# Patient Record
Sex: MALE | Race: WHITE | Employment: OTHER | URBAN - METROPOLITAN AREA
[De-identification: names, ages, dates, MRNs, and addresses within clinical notes are randomized per-mention and may not be internally consistent; named-entity substitution may affect disease eponyms.]

---

## 2020-08-10 ENCOUNTER — OFFICE VISIT (OUTPATIENT)
Dept: INTERNAL MEDICINE CLINIC | Age: 50
End: 2020-08-10
Payer: MEDICAID

## 2020-08-10 VITALS
HEART RATE: 64 BPM | SYSTOLIC BLOOD PRESSURE: 142 MMHG | BODY MASS INDEX: 38.39 KG/M2 | OXYGEN SATURATION: 98 % | TEMPERATURE: 97.7 F | WEIGHT: 259.2 LBS | DIASTOLIC BLOOD PRESSURE: 86 MMHG | RESPIRATION RATE: 16 BRPM | HEIGHT: 69 IN
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
## 2020-08-10 DIAGNOSIS — F51.01 PRIMARY INSOMNIA: ICD-10-CM

## 2020-08-10 DIAGNOSIS — E66.01 SEVERE OBESITY (HCC): ICD-10-CM

## 2020-08-10 DIAGNOSIS — I10 ESSENTIAL HYPERTENSION: Primary | ICD-10-CM

## 2020-08-10 PROCEDURE — 99214 OFFICE O/P EST MOD 30 MIN: CPT | Performed by: INTERNAL MEDICINE

## 2020-08-10 RX ORDER — TRAZODONE HYDROCHLORIDE 100 MG/1
TABLET ORAL
Qty: 45 TAB | Refills: 2 | Status: SHIPPED | OUTPATIENT
Start: 2020-08-10 | End: 2020-11-04

## 2020-08-10 RX ORDER — DILTIAZEM HYDROCHLORIDE 120 MG/1
120 CAPSULE, COATED, EXTENDED RELEASE ORAL DAILY
Qty: 30 CAP | Refills: 3 | Status: CANCELLED | OUTPATIENT
Start: 2020-08-10

## 2020-08-10 NOTE — PROGRESS NOTES
Yousuf Beckett presents today for   Chief Complaint   Patient presents with    Follow Up Chronic Condition     legs and [ain swelling from past tick bite       Is someone accompanying this pt? no    Is the patient using any DME equipment during [address]? no  Depression Screening:  3 most recent PHQ Screens 8/10/2020   Little interest or pleasure in doing things Not at all   Feeling down, depressed, irritable, or hopeless Not at all   Total Score PHQ 2 0       Learning Assessment:  No flowsheet data found. Abuse Screening:  No flowsheet data found. Fall Risk  No flowsheet data found. Health Maintenance reviewed and discussed and ordered per Provider. Health Maintenance Due   Topic Date Due    DTaP/Tdap/Td series (1 - Tdap) 03/11/1991    Lipid Screen  03/11/2010    Shingrix Vaccine Age 50> (1 of 2) 03/11/2020    FOBT Q1Y Age 54-65  03/11/2020    Influenza Age 5 to Adult  08/01/2020   . Coordination of Care:  1. Have you been to the ER, urgent care clinic since your last visit? Hospitalized since your last visit? Rash form doxycycline for tick bite, 6/17/20   2. Have you seen or consulted any other health care providers outside of the [facility] since your last visit? Include any pap smears or colon screening.  no

## 2020-08-10 NOTE — PROGRESS NOTES
1. Severe obesity ([address]  10 years to lose weight and very proud of him. He does have multiple aches and pains associated with his weight loss as well as some fatigue but this is not unexpected. 2. Essential hypertension  Pressure was a little bit high here and he would like to hold off until he sees his cardiologist on the 25th. He reports a history of intermittent A. fib but he is not on anticoagulation. I will defer to his cardiologist    3. Tick bite, initial encounter  She had another tick bite under his left underarm area. I see no signs of Lyme disease he has no lymphadenopathy I recommend conservative treatment at this time    4. Primary insomnia  He clearly has a primary insomnia disorder. We will start trazodone 100 mg nightly. If that is not successful he is to increase to 150 mg nightly. - traZODone (DESYREL) 100 mg tablet; 1 to 1.5 tabs po qhs prn  Dispense: 45 Tab; Refill: 2  Total time spent on this encounter was greater than 25 minutes more than 50% spent in counseling and coordination of care. This includes extensive discussions regarding the things to look for as he continues to lose weight, encouragement on his weight loss and things to look out for in the event that he were to contract Lyme disease or any other virus that can be or bacteria that could be co-transmitted. Chief Complaint   Patient presents with    Follow Up Chronic Condition     legs and [ain swelling from past tick bite        HPI   Is a very pleasant 59-year-old gentleman who presents today with numerous aches and pains. He has been working really hard to lose weight and now that he is lost lost over 100 pounds. ,  He is beginning to have aches and pains repeat everyplace is where he hits. He is also doing some rather strenuous activity reports more muscle pain than usual.  He also got a tick bite in under his left under arm,. He has no sequela.   Denies any fevers chills diffuse rashes body aches or other associated signs or symptoms. A lot of work out in the yard. He notes that he has areas that appear like swelling outpouchings on his skin. However he quickly realized during her interview that it was hanging skin. He did follow-up with urology per my last encounter with him due to a testicular mass which I thought was an epididymal cyst, and the urologist agreed with this assessment. He has no other complaints. Past Medical History:   Diagnosis Date    Afib [facility])         Current Outpatient Medications on File Prior to Visit   Medication Sig Dispense Refill    dilTIAZem ER (CARDIZEM CD) 120 mg capsule       aspirin delayed-release 81 mg tablet Take  by mouth daily. No current facility-administered medications on file prior to visit. ROS  - GEN: + weight loss, no fevers or chills  - HEENT: no vision changes, no tinnitus, no sore throat  - CV: no cp, palpitations or edema  - RESP: no sob, cough  - ABD: no n/v/d, no blood in stool  - : no dysuria or changes in freq. - SKIN: no rashes, ulcers  - Neuro: no resting tremors, parasthesia in extremities, no headaches  - MS: No weakness in extremities, no gait abnormalities  - Psych: negative for depression or anxiety + insomnia      Visit Vitals  /86   Pulse 64   Temp 97.7 °F (36.5 °C) (Temporal)   Resp 16   Ht 5' 9\" (1.753 m)   Wt 259 lb 3.2 oz (117.6 kg)   SpO2 98%   BMI 38.28 kg/m²           Physical Exam  Constitutional:       Appearance: Normal appearance. obese. [place] [employer] NAD and pleasant  HENT:      Head: Normocephalic. Raymond Friday is got a hemorrhoid g s are moist. Throat not inflammed  Eyes:      Extraocular Movements: Extraocular movements intact. Conjunctiva/sclera: Conjunctivae normal. Sclera anicteric     I will stop her I will stop her  Cardiovascular:      Rate and Rhythm: Normal rate and regular rhythm. Pulses: Normal pulses. Pulmonary:      Effort: No respiratory distress. Breath sounds: CTAB and No stridor.  No rhonchi. Abdominal:      General: There is no distension. NT, N vein. no masses  Neurological:      Mental Status: patient is alert and oriented times 3.  No resting tremor, normal gait     Cranial Nerves: cranial nerves grossly intact  Muskuloskeletal     Full ROM in extremities     Normal gait  NO LAD under left arm  Skin     Dry without lesions on examined areas, warm to the touch       Deferred  Psychiatry     Calm, normal affect, interacting normally

## 2020-08-25 ENCOUNTER — TELEPHONE (OUTPATIENT)
Dept: INTERNAL MEDICINE CLINIC | Age: 50
End: 2020-08-25

## 2020-08-25 NOTE — TELEPHONE ENCOUNTER
Patient left a voicemail requesting a PT referral.  HE was seen on 8/10/20. Please call him about this.   [id number]

## 2020-09-02 ENCOUNTER — TELEPHONE (OUTPATIENT)
Dept: INTERNAL MEDICINE CLINIC | Age: 50
End: 2020-09-02

## 2020-09-02 DIAGNOSIS — M54.2 NECK PAIN: Primary | ICD-10-CM

## 2020-09-10 ENCOUNTER — TELEPHONE (OUTPATIENT)
Dept: INTERNAL MEDICINE CLINIC | Age: 50
End: 2020-09-10

## 2020-09-10 NOTE — TELEPHONE ENCOUNTER
Patient called and reports after having course ofk Doxycycline from you that he  still having burning sensation and residual effects for, the tickl bite and wondering id there are some labs or another course of treatment. He would like to talk to you about this.      [phone]

## 2020-09-18 ENCOUNTER — TELEPHONE (OUTPATIENT)
Dept: INTERNAL MEDICINE CLINIC | Age: 50
End: 2020-09-18
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
## 2020-09-18 DIAGNOSIS — S30.861S TICK BITE OF ABDOMEN, SEQUELA: ICD-10-CM

## 2020-09-18 DIAGNOSIS — R53.1 WEAKNESS: Primary | ICD-10-CM

## 2020-09-18 NOTE — TELEPHONE ENCOUNTER
Patient called and said that his tick bite is no better and that he was only ordered for one session of PT and he needs more for his hurt neck. He can be reached at [phone]. [phone]

## 2020-09-18 NOTE — TELEPHONE ENCOUNTER
Patient made aware. The issue he is having with the tick bite is, soreness where the tick bite was. He did take 4 days of Doxycycline but he had a reaction and he was never given any further treatment. This was with previous provider. He also questioned being referred to infectious disease doctor to see if he has any further tick borne illnesses due to side effects he is still having. He sometime has heart palpitations, fatigue, etc and he does not know why they are happening. He said that you did tell him all his bloodwork was normal, but he says it has been 3 months and he is not any better. He wonders if he needs to complete a full round of antibiotics since he did not get to finish the Doxy. Please advise.   [address]

## 2020-09-18 NOTE — TELEPHONE ENCOUNTER
Patient exposed to tick in the past and was treated with 4 days of doxycycline. He discontinued this medication because he did not tolerate it. I having had encounter with him before and he insists he is continuing to have muscle weakness although no other focal symptoms. I am going to go ahead and resend out a Lyme panel as well as a CMP and a CBC.   I am also going to check a CK and aldolase level also ordered additional physical therapy

## 2020-09-21 NOTE — TELEPHONE ENCOUNTER
Patient made aware and will go to [facility] [place] and have his labs done.   [id number]

## 2020-09-22 ENCOUNTER — HOSPITAL ENCOUNTER (OUTPATIENT)
Dept: LAB | Age: 50
Discharge: HOME | End: 2020-09-22

## 2020-09-24 LAB
ALBUMIN SERPL-MCNC: 4.5 G/DL (ref 4–5)
ALBUMIN/GLOB SERPL: 1.4 {RATIO} (ref 1.2–2.2)
ALDOLASE SERPL-CCNC: 5.5 U/L (ref 3.3–10.3)
ALP SERPL-CCNC: 110 IU/L (ref 39–117)
ALT SERPL-CCNC: 20 IU/L (ref 0–44)
AST SERPL-CCNC: 22 IU/L (ref 0–40)
B BURGDOR IGG+IGM SER-ACNC: <0.91 ISR (ref 0–0.9)
BASOPHILS # BLD AUTO: 0.1 X10E3/UL (ref 0–0.2)
BASOPHILS NFR BLD AUTO: 1 %
BILIRUB SERPL-MCNC: 0.4 MG/DL (ref 0–1.2)
BUN SERPL-MCNC: 13 MG/DL (ref 6–24)
BUN/CREAT SERPL: 11 (ref 9–20)
CALCIUM SERPL-MCNC: 9.3 MG/DL (ref 8.7–10.2)
CHLORIDE SERPL-SCNC: 102 MMOL/L (ref 96–106)
CK SERPL-CCNC: 72 U/L (ref 49–439)
CO2 SERPL-SCNC: 19 MMOL/L (ref 20–29)
CREAT SERPL-MCNC: 1.23 MG/DL (ref 0.76–1.27)
EOSINOPHIL # BLD AUTO: 0.3 X10E3/UL (ref 0–0.4)
EOSINOPHIL NFR BLD AUTO: 3 %
ERYTHROCYTE [DISTWIDTH] IN BLOOD BY AUTOMATED COUNT: 13 % (ref 11.6–15.4)
GLOBULIN SER CALC-MCNC: 3.3 G/DL (ref 1.5–4.5)
GLUCOSE SERPL-MCNC: 102 MG/DL (ref 65–99)
HCT VFR BLD AUTO: 48.6 % (ref 37.5–51)
HGB BLD-MCNC: 16.9 G/DL (ref 13–17.7)
IMM GRANULOCYTES # BLD AUTO: 0 X10E3/UL (ref 0–0.1)
IMM GRANULOCYTES NFR BLD AUTO: 0 %
LYMPHOCYTES # BLD AUTO: 3.1 X10E3/UL (ref 0.7–3.1)
LYMPHOCYTES NFR BLD AUTO: 31 %
MCH RBC QN AUTO: 31 PG (ref 26.6–33)
MCHC RBC AUTO-ENTMCNC: 34.8 G/DL (ref 31.5–35.7)
MCV RBC AUTO: 89 FL (ref 79–97)
MONOCYTES # BLD AUTO: 0.7 X10E3/UL (ref 0.1–0.9)
MONOCYTES NFR BLD AUTO: 7 %
NEUTROPHILS # BLD AUTO: 5.6 X10E3/UL (ref 1.4–7)
NEUTROPHILS NFR BLD AUTO: 58 %
PLATELET # BLD AUTO: 319 X10E3/UL (ref 150–450)
POTASSIUM SERPL-SCNC: 4.5 MMOL/L (ref 3.5–5.2)
PROT SERPL-MCNC: 7.8 G/DL (ref 6–8.5)
RBC # BLD AUTO: 5.46 X10E6/UL (ref 4.14–5.8)
SODIUM SERPL-SCNC: 140 MMOL/L (ref 134–144)
SPECIMEN STATUS REPORT, ROLRST: NORMAL
WBC # BLD AUTO: 9.8 X10E3/UL (ref 3.4–10.8)

## 2020-10-16 ENCOUNTER — TELEPHONE (OUTPATIENT)
Dept: INTERNAL MEDICINE CLINIC | Age: 50
End: 2020-10-16

## 2020-10-29 ENCOUNTER — HOSPITAL ENCOUNTER (EMERGENCY)
Age: 50
Discharge: HOME | End: 2020-10-29
Attending: FAMILY MEDICINE
Payer: MEDICAID

## 2020-10-29 ENCOUNTER — APPOINTMENT (OUTPATIENT)
Dept: GENERAL RADIOLOGY | Age: 50
End: 2020-10-29
Attending: FAMILY MEDICINE
Payer: MEDICAID

## 2020-10-29 VITALS
HEART RATE: 81 BPM | OXYGEN SATURATION: 100 % | WEIGHT: 259 LBS | DIASTOLIC BLOOD PRESSURE: 91 MMHG | SYSTOLIC BLOOD PRESSURE: 129 MMHG | BODY MASS INDEX: 38.36 KG/M2 | RESPIRATION RATE: 12 BRPM | TEMPERATURE: 97.9 F | HEIGHT: 69 IN

## 2020-10-29 DIAGNOSIS — I48.91 ATRIAL FIBRILLATION WITH RVR (HCC): Primary | ICD-10-CM

## 2020-10-29 LAB
ANION GAP SERPL CALC-SCNC: 11 MMOL/L
ATRIAL RATE: 146 BPM
BASOPHILS # BLD: 0.1 K/UL (ref 0–0.1)
BASOPHILS NFR BLD: 1 % (ref 0–2)
BUN SERPL-MCNC: 13 MG/DL (ref 9–21)
BUN/CREAT SERPL: 14
CA-I BLD-MCNC: 9 MG/DL (ref 8.5–10.5)
CALCULATED R AXIS, ECG10: 56 DEGREES
CALCULATED T AXIS, ECG11: 8 DEGREES
CHLORIDE SERPL-SCNC: 108 MMOL/L (ref 94–111)
CO2 SERPL-SCNC: 20 MMOL/L (ref 21–33)
CREAT SERPL-MCNC: 0.9 MG/DL (ref 0.8–1.5)
DIAGNOSIS, 93000: NORMAL
EOSINOPHIL # BLD: 0.2 K/UL (ref 0–0.4)
EOSINOPHIL NFR BLD: 2 % (ref 0–5)
ERYTHROCYTE [DISTWIDTH] IN BLOOD BY AUTOMATED COUNT: 13.5 % (ref 11.6–14.5)
GLUCOSE SERPL-MCNC: 94 MG/DL (ref 70–110)
HCT VFR BLD AUTO: 48.6 % (ref 36–48)
HGB BLD-MCNC: 16.4 G/DL (ref 13–16)
IMM GRANULOCYTES # BLD AUTO: 0 K/UL
IMM GRANULOCYTES NFR BLD AUTO: 0 %
LYMPHOCYTES # BLD: 2.1 K/UL (ref 0.9–3.6)
LYMPHOCYTES NFR BLD: 25 % (ref 21–52)
MAGNESIUM SERPL-MCNC: 2 MG/DL (ref 1.7–2.8)
MCH RBC QN AUTO: 30.4 PG (ref 24–34)
MCHC RBC AUTO-ENTMCNC: 33.7 G/DL (ref 31–37)
MCV RBC AUTO: 90 FL (ref 74–97)
MONOCYTES # BLD: 0.8 K/UL (ref 0.05–1.2)
MONOCYTES NFR BLD: 9 % (ref 3–10)
NEUTS SEG # BLD: 5.4 K/UL (ref 1.8–8)
NEUTS SEG NFR BLD: 63 % (ref 40–73)
P-R INTERVAL, ECG05: 33 MS
PLATELET # BLD AUTO: 289 K/UL (ref 135–420)
PMV BLD AUTO: 11 FL (ref 9.2–11.8)
POTASSIUM SERPL-SCNC: 3.9 MMOL/L (ref 3.2–5.1)
Q-T INTERVAL, ECG07: 317 MS
QRS DURATION, ECG06: 106 MS
QTC CALCULATION (BEZET), ECG08: 447 MS
RBC # BLD AUTO: 5.4 M/UL (ref 4.7–5.5)
SODIUM SERPL-SCNC: 139 MMOL/L (ref 135–145)
TROPONIN I SERPL-MCNC: <0.02 NG/ML (ref 0.02–0.05)
VENTRICULAR RATE, ECG03: 119 BPM
WBC # BLD AUTO: 8.5 K/UL (ref 4.6–13.2)

## 2020-10-29 PROCEDURE — 80048 BASIC METABOLIC PNL TOTAL CA: CPT

## 2020-10-29 PROCEDURE — 36415 COLL VENOUS BLD VENIPUNCTURE: CPT

## 2020-10-29 PROCEDURE — 83735 ASSAY OF MAGNESIUM: CPT

## 2020-10-29 PROCEDURE — 93005 ELECTROCARDIOGRAM TRACING: CPT

## 2020-10-29 PROCEDURE — 71045 X-RAY EXAM CHEST 1 VIEW: CPT

## 2020-10-29 PROCEDURE — 85025 COMPLETE CBC W/AUTO DIFF WBC: CPT

## 2020-10-29 PROCEDURE — 84484 ASSAY OF TROPONIN QUANT: CPT

## 2020-10-29 PROCEDURE — 99285 EMERGENCY DEPT VISIT HI MDM: CPT

## 2020-10-29 NOTE — ED PROVIDER NOTES
EMERGENCY DEPARTMENT HISTORY AND PHYSICAL EXAM      Date: 10/29/2020  Patient Name: Benson Farris    History of Presenting Illness     No chief complaint on file. History Provided By: Patient    HPI: Sanchez Read, 48 y.o. male with past medical history significant of A-fib presents to the ED with complaints of palpitations. Pt reports having general malaise all of yesterday. After using the restroom and drinking water last night, patient felt his heart rhythm go into A-fib. Pt awoke with nausea this morning and reports associated mild near-syncope, but denies dizziness, shortness of breath, vomiting, leg swelling, chest pain or any other sx. Pt took diltiazem 120 mg and ASA 81 mg this morning as prescribed. Pt notes that he was dx with A-fib shortly after tick bite 6 months ago and states he has experienced similar issues since. Cardiology: [facility]. There are no other complaints, changes, or physical findings at this time. PCP: Josiah Cary MD    Current Facility-Administered Medications   Medication Dose Route Frequency Provider Last Rate Last Dose    dilTIAZem (CARDIZEM) 125 mg in dextrose 5% 125 mL infusion  10 mg/hr IntraVENous TITRATE Ana Nava MD         Current Outpatient Medications   Medication Sig Dispense Refill    traZODone (DESYREL) 100 mg tablet 1 to 1.5 tabs po qhs prn 45 Tab 2    aspirin delayed-release 81 mg tablet Take  by mouth daily.          Past History     Past Medical History:  Past Medical History:   Diagnosis Date    Afib [facility])        Past Surgical History:  Past Surgical History:   Procedure Laterality Date    HX CHOLECYSTECTOMY      HX CYST REMOVAL      HX HERNIA REPAIR      HX PACEMAKER      Reveal Cha Haley [id number]       Family History:  Family History   Problem Relation Age of Onset    Hypertension Father     Prostate Cancer Father        Social History:  Social History     Tobacco Use    Smoking status: Never Smoker    Smokeless tobacco: Never Used   Substance Use Topics    Alcohol use: Not Currently    Drug use: Never       Allergies: Allergies   Allergen Reactions    Doxycycline Other (comments)     Tingling all over, vision changes         Review of Systems   Review of Systems   Constitutional: Negative for chills, diaphoresis, fatigue and fever. HENT: Negative for congestion, rhinorrhea and sore throat. Eyes: Negative for pain, redness and visual disturbance. Respiratory: Negative for cough, chest tightness, shortness of breath and wheezing. Cardiovascular: Positive for palpitations. Negative for chest pain and leg swelling. Gastrointestinal: Positive for nausea. Negative for abdominal pain, diarrhea and vomiting. Genitourinary: Negative for dysuria. Musculoskeletal: Negative for arthralgias, back pain and myalgias. Skin: Negative for color change and rash. Neurological: Positive for light-headedness. Negative for dizziness, weakness, numbness and headaches. Physical Exam   Physical Exam  Vitals signs and nursing note reviewed. Constitutional:       General: He is awake. He is not in acute distress. Appearance: Normal appearance. He is well-developed. He is obese. He is not ill-appearing, toxic-appearing or diaphoretic. Interventions: Face mask in place. HENT:      Head: Normocephalic and atraumatic. Eyes:      Extraocular Movements: Extraocular movements intact. Pupils: Pupils are equal, round, and reactive to light. Neck:      Musculoskeletal: Normal range of motion and neck supple. Cardiovascular:      Rate and Rhythm: Tachycardia present. Rhythm irregularly irregular. Pulses: Normal pulses. Heart sounds: Normal heart sounds. Pulmonary:      Effort: Pulmonary effort is normal.      Breath sounds: Normal breath sounds. Abdominal:      General: Abdomen is flat. Palpations: Abdomen is soft. Tenderness: There is no abdominal tenderness. Musculoskeletal:      Right lower leg: No edema. Left lower leg: No edema. Skin:     General: Skin is warm and dry. Neurological:      Mental Status: He is alert and oriented to person, place, and time. GCS: GCS eye subscore is 4. GCS verbal subscore is 5. GCS motor subscore is 6. Psychiatric:         Mood and Affect: Mood and affect normal.         Behavior: Behavior normal. Behavior is cooperative. Thought Content: Thought content normal.         Diagnostic Study Results     Labs -     Recent Results (from the past 12 hour(s))   EKG, 12 LEAD, INITIAL    Collection Time: 10/29/20 11:41 AM   Result Value Ref Range    Ventricular Rate 119 BPM    Atrial Rate 146 BPM    P-R Interval 33 ms    QRS Duration 106 ms    Q-T Interval 317 ms    QTC Calculation (Bezet) 447 ms    Calculated R Axis 56 degrees    Calculated T Axis 8 degrees    Diagnosis       Atrial fibrillation  NO ISCHEMIC CHANGES  Baseline wander in lead(s) V1,V3,V4,V5,V6    Confirmed by ROSI Lopez ([id number]) on 10/29/2020 12:59:32 PM     CBC WITH AUTOMATED DIFF    Collection Time: 10/29/20 12:15 PM   Result Value Ref Range    WBC 8.5 4.6 - 13.2 K/uL    RBC 5.40 4.70 - 5.50 M/uL    HGB 16.4 (H) 13.0 - 16.0 g/dL    HCT 48.6 (H) 36.0 - 48.0 %    MCV 90.0 74.0 - 97.0 FL    MCH 30.4 24.0 - 34.0 PG    MCHC 33.7 31.0 - 37.0 g/dL    RDW 13.5 11.6 - 14.5 %    PLATELET 187 095 - 589 K/uL    MPV 11.0 9.2 - 11.8 FL    NEUTROPHILS 63 40 - 73 %    LYMPHOCYTES 25 21 - 52 %    MONOCYTES 9 3 - 10 %    EOSINOPHILS 2 0 - 5 %    BASOPHILS 1 0 - 2 %    IMMATURE GRANULOCYTES 0 %    ABS. NEUTROPHILS 5.4 1.8 - 8.0 K/UL    ABS. LYMPHOCYTES 2.1 0.9 - 3.6 K/UL    ABS. MONOCYTES 0.8 0.05 - 1.2 K/UL    ABS. EOSINOPHILS 0.2 0.0 - 0.4 K/UL    ABS. BASOPHILS 0.1 0.0 - 0.1 K/UL    ABS. IMM.  GRANS. 0.0 K/UL   METABOLIC PANEL, BASIC    Collection Time: 10/29/20 12:15 PM   Result Value Ref Range    Sodium 139 135 - 145 mmol/L    Potassium 3.9 3.2 - 5.1 mmol/L Chloride 108 94 - 111 mmol/L    CO2 20 (L) 21 - 33 mmol/L    Anion gap 11 mmol/L    Glucose 94 70 - 110 mg/dL    BUN 13 9 - 21 mg/dL    Creatinine 0.90 0.8 - 1.50 mg/dL    BUN/Creatinine ratio 14      GFR est AA >60 ml/min/1.73m2    GFR est non-AA >60 ml/min/1.73m2    Calcium 9.0 8.5 - 10.5 mg/dL   TROPONIN I    Collection Time: 10/29/20 12:15 PM   Result Value Ref Range    Troponin-I, Qt. <0.02 (L) 0.02 - 0.05 ng/mL   MAGNESIUM    Collection Time: 10/29/20 12:15 PM   Result Value Ref Range    Magnesium 2.0 1.7 - 2.8 mg/dL       Radiologic Studies -   XR CHEST PORT   Final Result   IMPRESSION:      No acute radiographic cardiopulmonary abnormality. CT Results  (Last 48 hours)    None        CXR Results  (Last 48 hours)               10/29/20 1225  XR CHEST PORT Final result    Impression:  IMPRESSION:       No acute radiographic cardiopulmonary abnormality. Narrative:  EXAM: XR CHEST PORT       CLINICAL INDICATION/HISTORY: palpitations   -Additional: None       COMPARISON: June 25, 2020       TECHNIQUE: Frontal view of the chest       _______________       FINDINGS:       HEART AND MEDIASTINUM: Normal cardiac size and mediastinal contours. Implantable   loop recorder projects just inferior to the aortic knob. LUNGS AND PLEURAL SPACES: No focal pneumonic consolidation, pneumothorax, or   pleural effusion. BONY THORAX AND SOFT TISSUES: No acute osseous abnormality       _______________                 Medical Decision Making and ED Course   I am the first provider for this patient. I reviewed the vital signs, available nursing notes, past medical history, past surgical history, family history and social history. Vital Signs-Reviewed the patient's vital signs.   Patient Vitals for the past 12 hrs:   Temp Pulse Resp BP SpO2   10/29/20 1230 -- 83 16 126/60 100 %   10/29/20 1205 97.9 °F (36.6 °C) 87 16 (!) 142/74 99 %       EKG interpretation: (Preliminary): Performed at 11:41 AM; Read at 11:41 AM.  Rhythm: atrial fibrillation with RVR, ventricular premature complex; Rate (approx.): 119; Axis: normal}; QRS interval: normal ; ST/T wave: normal; Other findings: No ischemic changes. Records Reviewed: Nursing Notes and Old Medical Records    The patient presents with palpitations with a differential diagnosis of Atrial fibrillation with RVR, electrolyte abnormality and anemia. ED Course:   Initial assessment performed. The patients presenting problems have been discussed, and they are in agreement with the care plan formulated and outlined with them. I have encouraged them to ask questions as they arise throughout their visit. Provider Notes (Medical Decision Making):   12:20 PM. Patient's heart rate has lowered into 80s prior to giving Cardizem. [phone] patient is a 80-year-old male with a history of A. fib. He is on Cardizem 120 mg for this. He woke up this morning and noticed his heart rate was fast.  He came in he was in A. fib RVR. We are preparing to give him Cardizem bolus and drip when his rate slowed to the 80s. Is remained in the 80s for the last 2  hours. His lab evaluation and x-ray were unremarkable. Neurology was consulted. They have stopped his Cardizem and will switch him to metoprolol. He is to follow-up tomorrow in their office to have his loop monitor interrogated. Dose likely will end up being referred to EP for ablation. Consultations:       Consultations: Cardiology Consult: ELDON Doan: We have asked for emergent assistance with regard to this patient. We have discussed the patients HPI, ROS, PE and EKG results thus far. They will come and evaluate the patient for their acute cardiac needs and further treatment. 2:20 PM. ELDON Doan has evaluated the patient and advises he be started on metoprolol and follow up in office. Disposition     Discharged    DISCHARGE PLAN:  1. START METOPROLOL.   Current Discharge Medication List CONTINUE these medications which have NOT CHANGED    Details   traZODone (DESYREL) 100 mg tablet 1 to 1.5 tabs po qhs prn  Qty: 45 Tab, Refills: 2    Associated Diagnoses: Primary insomnia      dilTIAZem ER (CARDIZEM CD) 120 mg capsule       aspirin delayed-release 81 mg tablet Take  by mouth daily. 2.   Follow-up Information     Follow up With Specialties Details Why Contact Info    Aleyda Claros NP Nurse Practitioner In 1 day  [address] [address] [phone]          3. Return to ED if worse     Diagnosis     Clinical Impression:   1. Atrial fibrillation with RVR ([address]        Attestations:    By signing my name below, Alex Jensen, attest that this documentation has been prepared under the direction and in presence of Dr. Abelardo Caicedo on 10/29/20. Electronically signed: Nikki Cavazos, 10/29/20, 12:21 PM      Please note that this dictation was completed with [employer], the computer voice recognition software. Quite often unanticipated grammatical, syntax, homophones, and other interpretive errors are inadvertently transcribed by the computer software. Please disregard these errors. Please excuse any errors that have escaped final proofreading. Thank you.

## 2020-10-29 NOTE — CONSULTS
[facility] CARDIOLOGY  CARDIOLOGY CONSULTATION    REASON FOR CONSULT: Atrial fibrillation with RVR    REQUESTING PROVIDER: Nuvia Segura MD    CHIEF COMPLAINT:  Palpitations    HISTORY OF PRESENT ILLNESS:  Janice Rico is a 48y.o. year-old male with past medical history significant for atrial fibrillation and hypertension who was seen today for evaluation of atrial fibrillation with RVR. The patient presented to the [facility] ER today for evaluation of palpitations. He states his symptoms started overnight. He woke up around 0200 with palpitations. He checked his BP and HR on his home monitor and his HR was 57 and irregular. His symptoms continued, and he reports feeling mild lightheadedness and mild diaphoresis, so he came to the ER. On initial presentation, his HR was in the 110s. He has been in the 80s for the past few hours and at the time of the visit. He denies any chest pain or shortness of breath. He reports compliance with medications. Records from hospital admission course thus far reviewed. Telemetry reviewed. No events overnight. The patient is in atrial fibrillation, rate in the 80s . INPATIENT MEDICATIONS:  Home medications reviewed. Current Facility-Administered Medications:     dilTIAZem (CARDIZEM) 125 mg in dextrose 5% 125 mL infusion, 10 mg/hr, IntraVENous, TITRATE, Kirt Carmona MD    Current Outpatient Medications:     traZODone (DESYREL) 100 mg tablet, 1 to 1.5 tabs po qhs prn, Disp: 45 Tab, Rfl: 2    dilTIAZem ER (CARDIZEM CD) 120 mg capsule, , Disp: , Rfl:     aspirin delayed-release 81 mg tablet, Take  by mouth daily. , Disp: , Rfl:      ALLERGIES:  Allergies reviewed with the patient,  Allergies   Allergen Reactions    Doxycycline Other (comments)     Tingling all over, vision changes    . FAMILY HISTORY:  Family history reviewed. SOCIAL HISTORY:  Notable for no tobacco use, no alcohol use, and no illicit drug use.       REVIEW OF SYSTEMS: Complete review of systems performed, pertinents noted above, all other systems are negative. PHYSICAL EXAMINATION:  Vital sign assessment reveal a blood pressure of  134/81 and pulse rate of 87. Cardiovascular exam has a heart with an irregularly irregular rhythm, controlled rate, normal S1 and S2. No murmur present. There are no rubs or gallops. Good peripheral pulses. No jugular venous distension. Respiratory exam reveals clear lung fields, no rales or rhonchi. Lymphatic exam reveals no edema. Neurologic exam is nonfocal.      Recent labs results and imaging reviewed. Notable findings include:   Recent Results (from the past 24 hour(s))   EKG, 12 LEAD, INITIAL    Collection Time: 10/29/20 11:41 AM   Result Value Ref Range    Ventricular Rate 119 BPM    Atrial Rate 146 BPM    P-R Interval 33 ms    QRS Duration 106 ms    Q-T Interval 317 ms    QTC Calculation (Bezet) 447 ms    Calculated R Axis 56 degrees    Calculated T Axis 8 degrees    Diagnosis       Atrial fibrillation  NO ISCHEMIC CHANGES  Baseline wander in lead(s) V1,V3,V4,V5,V6    Confirmed by ROSI Downey ([id number]) on 10/29/2020 12:59:32 PM     CBC WITH AUTOMATED DIFF    Collection Time: 10/29/20 12:15 PM   Result Value Ref Range    WBC 8.5 4.6 - 13.2 K/uL    RBC 5.40 4.70 - 5.50 M/uL    HGB 16.4 (H) 13.0 - 16.0 g/dL    HCT 48.6 (H) 36.0 - 48.0 %    MCV 90.0 74.0 - 97.0 FL    MCH 30.4 24.0 - 34.0 PG    MCHC 33.7 31.0 - 37.0 g/dL    RDW 13.5 11.6 - 14.5 %    PLATELET 605 467 - 992 K/uL    MPV 11.0 9.2 - 11.8 FL    NEUTROPHILS 63 40 - 73 %    LYMPHOCYTES 25 21 - 52 %    MONOCYTES 9 3 - 10 %    EOSINOPHILS 2 0 - 5 %    BASOPHILS 1 0 - 2 %    IMMATURE GRANULOCYTES 0 %    ABS. NEUTROPHILS 5.4 1.8 - 8.0 K/UL    ABS. LYMPHOCYTES 2.1 0.9 - 3.6 K/UL    ABS. MONOCYTES 0.8 0.05 - 1.2 K/UL    ABS. EOSINOPHILS 0.2 0.0 - 0.4 K/UL    ABS. BASOPHILS 0.1 0.0 - 0.1 K/UL    ABS. IMM.  GRANS. 0.0 K/UL   METABOLIC PANEL, BASIC    Collection Time: 10/29/20 12:15 PM Result Value Ref Range    Sodium 139 135 - 145 mmol/L    Potassium 3.9 3.2 - 5.1 mmol/L    Chloride 108 94 - 111 mmol/L    CO2 20 (L) 21 - 33 mmol/L    Anion gap 11 mmol/L    Glucose 94 70 - 110 mg/dL    BUN 13 9 - 21 mg/dL    Creatinine 0.90 0.8 - 1.50 mg/dL    BUN/Creatinine ratio 14      GFR est AA >60 ml/min/1.73m2    GFR est non-AA >60 ml/min/1.73m2    Calcium 9.0 8.5 - 10.5 mg/dL   TROPONIN I    Collection Time: 10/29/20 12:15 PM   Result Value Ref Range    Troponin-I, Qt. <0.02 (L) 0.02 - 0.05 ng/mL   MAGNESIUM    Collection Time: 10/29/20 12:15 PM   Result Value Ref Range    Magnesium 2.0 1.7 - 2.8 mg/dL     Discussed case with Dr. Aster Rodriguez, and our impression and recommendations are as follows:  Atrial fibrillation: Initially with mild RVR on presentation in the 110s. Rate is now controlled. Reviewed loop recorder data, no events have been recorded. Recommended he send a manual transmission from his loop recorder while in the ER. Will switch diltiazem to metoprolol XL 12.5 mg daily, given his uncontrolled symptoms. Will also refer to cardiac EP (Dr. Bridger Shaver) for further evaluation and possible ablation. His CHADS2-VASc score is 1; continue aspirin. Serum potassium and serum magnesium are in normal range. Will follow up with patient in office in 3 weeks to evaluate response from metoprolol XL. Discussed case with Dr. David Brooks. Thank you for involving us in the care of this patient. Please do not hesitate to call me or Dr. Aster Rodriguez if additional questions arise.

## 2020-10-29 NOTE — ED TRIAGE NOTES
\"I started having palpitations this morning and I took my medications, but I could feel my heart racing. \"

## 2020-10-30 LAB
ATRIAL RATE: 146 BPM
CALCULATED R AXIS, ECG10: 56 DEGREES
CALCULATED T AXIS, ECG11: 8 DEGREES
DIAGNOSIS, 93000: NORMAL
P-R INTERVAL, ECG05: 33 MS
Q-T INTERVAL, ECG07: 317 MS
QRS DURATION, ECG06: 106 MS
QTC CALCULATION (BEZET), ECG08: 447 MS
VENTRICULAR RATE, ECG03: 119 BPM

## 2020-11-04 ENCOUNTER — OFFICE VISIT (OUTPATIENT)
Dept: CARDIOLOGY CLINIC | Age: 50
End: 2020-11-04
Payer: MEDICAID

## 2020-11-04 VITALS
SYSTOLIC BLOOD PRESSURE: 128 MMHG | HEART RATE: 68 BPM | WEIGHT: 249 LBS | DIASTOLIC BLOOD PRESSURE: 86 MMHG | HEIGHT: 69 IN | OXYGEN SATURATION: 98 % | BODY MASS INDEX: 36.88 KG/M2

## 2020-11-04 DIAGNOSIS — Z95.9 CARDIAC DEVICE IN SITU: ICD-10-CM

## 2020-11-04 DIAGNOSIS — I48.0 PAROXYSMAL ATRIAL FIBRILLATION (HCC): ICD-10-CM

## 2020-11-04 DIAGNOSIS — I48.0 PAROXYSMAL ATRIAL FIBRILLATION (HCC): Primary | ICD-10-CM

## 2020-11-04 PROCEDURE — 99205 OFFICE O/P NEW HI 60 MIN: CPT | Performed by: INTERNAL MEDICINE

## 2020-11-04 RX ORDER — METOPROLOL SUCCINATE 25 MG/1
12.5 TABLET, EXTENDED RELEASE ORAL DAILY
COMMUNITY
Start: 2020-10-29 | End: 2021-06-18 | Stop reason: ALTCHOICE

## 2020-11-04 NOTE — PROGRESS NOTES
Chivo Ellison presents today for   Chief Complaint   Patient presents with    New Patient     Ref by [facility] Cardiology for Afib       Shanda Abner Farris preferred language for health care discussion is english/other. Is someone accompanying this pt? no    Is the patient using any DME equipment during [address]? no    Depression Screening:  3 most recent PHQ Screens 11/4/2020   Little interest or pleasure in doing things Not at all   Feeling down, depressed, irritable, or hopeless Not at all   Total Score PHQ 2 0       Learning Assessment:  Learning Assessment 11/4/2020   PRIMARY LEARNER Patient   PRIMARY LANGUAGE ENGLISH   LEARNER PREFERENCE PRIMARY DEMONSTRATION   ANSWERED BY christoph   RELATIONSHIP SELF       Abuse Screening:  Abuse Screening Questionnaire 11/4/2020   Do you ever feel afraid of your partner? N   Are you in a relationship with someone who physically or mentally threatens you? N   Is it safe for you to go home? Y       Fall Risk  Fall Risk Assessment, last 12 mths 11/4/2020   Able to walk? Yes   Fall in past 12 months? No       Pt currently taking Anticoagulant therapy? no    Coordination of Care:  1. Have you been to the ER, urgent care clinic since your last visit? Hospitalized since your last visit? yes    2. Have you seen or consulted any other health care providers outside of the [address] [place] [address] since your last visit? Include any pap smears or colon screening.  no

## 2020-11-04 NOTE — PROGRESS NOTES
History of Present Illness:  51-year-old male referred by [facility] Cardiology for paroxysmal symptomatic atrial fibrillation. He has been tried on calcium channel blocker, as well as recently Metoprolol, with minimal improvement in symptoms. He had a pilonidal cyst resection recently, as well as pancreatitis and cholecystectomy. He has been in the emergency department with increasing episodes and ultimately had a subcutaneous loop recorder placed, confirming his episodes. He has had at least seven that were quite symptomatic. He has cut out stimulants and he actually lost over 100 pounds, but still has the episodes. He is here to discuss options. Impression:  1. Paroxysmal symptomatic atrial fibrillation with failure of calcium channel blockers and beta blockers. 2. History of recent sleep study with heart rate down into the 30s. No obvious sleep apnea. 3. Recent 100 pound weight loss, intentional.  4. History of pilonidal cyst surgery, which seemed to be the trigger for his a-fib. 5. History of pancreatitis and cholecystitis recently, however he did not have an exacerbation of his atrial fibrillation. 6. Family history of father with pacemaker, which I placed, as well as atrial fibrillation. 7. Subcutaneous loop recorder placed 10/20/20. Plan:  I had a lengthy discussion about treatment options, including rate versus rhythm control and stroke risk, aspirin is reasonable. I talked about ablation, as well as antiarrhythmic, pacemaker or additional medications. Given the symptoms and breakthrough episodes, we elected to proceed with pulmonary vein isolation with cryo. The procedure was explained at length, all questions answered. He will be admitted to the hospital and then started on Eliquis post procedure and also Flecainide.       Past Medical History:   Diagnosis Date    Afib [facility])        Current Outpatient Medications   Medication Sig Dispense Refill    metoprolol succinate (TOPROL-XL) 25 mg XL tablet Take 12.5 mg by mouth daily.  aspirin delayed-release 81 mg tablet Take  by mouth daily. Social History   reports that he has never smoked. He has never used smokeless tobacco.   reports previous alcohol use. Family History  family history includes Hypertension in his father; Prostate Cancer in his father. Review of Systems  Except as stated above include:  Constitutional: Negative for fever, chills and malaise/fatigue. HEENT: No congestion or recent URI. Gastrointestinal: No nausea, vomiting, abdominal pain, bloody stools. Pulmonary:  Negative except as stated above. Cardiac:  Negative except as stated above. Musculoskeletal: Negative except as stated above. Neurological:  No localized symptoms. Skin:  Negative except as stated above. Psych:  Negative except as stated above. Endocrine:  Negative except as stated above. PHYSICAL EXAM  BP Readings from Last 3 Encounters:   11/04/20 128/86   10/29/20 (!) 129/91   08/10/20 142/86     Pulse Readings from Last 3 Encounters:   11/04/20 68   10/29/20 81   08/10/20 64     Wt Readings from Last 3 Encounters:   11/04/20 112.9 kg (249 lb)   10/29/20 117.5 kg (259 lb)   08/10/20 117.6 kg (259 lb 3.2 oz)     General:   Well developed, well groomed. Head/Neck:   No obvious jugular venous distention     No obvious carotid pulsations. No evidence of xanthelasma. Lungs:   No respiratory distress. Clear bilaterally. Heart:  Regular rate and rhythm. Normal S1/S2. Palpation grossly normal.    No significant murmurs, rubs or gallops. Abdomen:   Non-acute abdomen. No obvious pulsations. Extremities:   Intact peripheral pulses. No significant edema. Neurological:   Alert and oriented to person, place, time. No focal neurological deficit visually. Skin:   No obvious rash    Blood Pressure Metric:  Monitor recommended and adjustments stated if needed.

## 2020-11-04 NOTE — LETTER
11/4/2020 2:45 PM 
 
 
 
Jojo Palm 
[id number] 
1970 Insurance:  [employer]                 Auth # _____________________ Proc Date: Tues. 12/1                Proc Time:  8:00am 
 
Performing MD : Dr. Margareth Gaspar                      Procedure:SHAILESH/AFib Ablation Hospital:  [facility]                                            PCP Dr. Shandra Qureshi Scheduled with:  Farheen/EMail                                                        Date:11/4/2020 HP: 11/4     EKG: ______    Labs:______  CXR: _______  Orders: 11/4 Special Instructions:  _____________________________________________________ 
______________________________________________________________________ 
______________________________________________________________________ Date Faxed:   ______________   Pages Faxed: ___________________ The materials enclosed with this facsimile transmission are private and confidential and are the property of the sender. If you are not the intended recipient, be advised that any unauthorized use, disclosure, copying, distribution, or the taking of any action in reliance on the contents of this telecopied information is strictly prohibited. If you have received this in error, please immediately notify the sender via telephone to arrange for return of the forwarded documentation.

## 2020-11-04 NOTE — PATIENT INSTRUCTIONS
Need Covid 19 testing 11/27/2020 and other lab work - Covid testing in only done at [facility] lab, between the hours of 1pm - 3pm    If you have not heard from the central scheduler to schedule your CT scan testing in 48 hours, please call [phone]. DR. PIRES [facility]          Patient  EP Instructions                  1. You are scheduled to have a SHAILESH and possible Afib Ablation on  12/1/2020 , at 08:00am.    Please check in at 07:00am.    2. Please go to DR. PRANAY SHEIKH and park in the outpatient parking lot that is located around to the back of the hospital and enter through the [facility] building. Once you enter through the [facility] check in with the  there. The  will either give you directions or assist you in getting to the cath holding area. 3.  You are not to eat or drink anything after midnight the night before your                   procedure. Please continue to take your medications with a small sip of water on the morning of the procedure with the following exceptions:    4. If you are diabetic, do not take your insulin/sugar pill the morning of the procedure. 6. We encourage families to wait in the waiting room on the first floor while the procedure is being done. The Doctor will come out and talk with you as soon as the procedure is over. 7. There is the possibility that you may spend the night in the hospital, depending on the results of the procedure. This will be determined after the procedure is done. 8.   If you or your family have any questions, please call our office Monday-Friday 9:00am         -4:30 pm , at [phone], and ask to speak to one of the nurses. Florentino Cortez

## 2020-11-05 ENCOUNTER — TRANSCRIBE ORDER (OUTPATIENT)
Dept: CARDIAC CATH/INVASIVE PROCEDURES | Age: 50
End: 2020-11-05

## 2020-11-05 DIAGNOSIS — I48.91 A-FIB (HCC): Primary | ICD-10-CM

## 2020-11-05 RX ORDER — SODIUM CHLORIDE 0.9 % (FLUSH) 0.9 %
5-40 SYRINGE (ML) INJECTION AS NEEDED
Status: CANCELLED | OUTPATIENT
Start: 2020-11-05

## 2020-11-05 RX ORDER — SODIUM CHLORIDE 0.9 % (FLUSH) 0.9 %
5-40 SYRINGE (ML) INJECTION EVERY 8 HOURS
Status: CANCELLED | OUTPATIENT
Start: 2020-11-05

## 2020-11-10 ENCOUNTER — DOCUMENTATION ONLY (OUTPATIENT)
Dept: CARDIOLOGY CLINIC | Age: 50
End: 2020-11-10

## 2020-11-11 ENCOUNTER — TELEPHONE (OUTPATIENT)
Dept: CARDIOLOGY CLINIC | Age: 50
End: 2020-11-11

## 2020-11-11 NOTE — TELEPHONE ENCOUNTER
----- Message from Keysha Farris sent at 11/10/2020  5:57 PM EST -----  Regarding: RE: Prescription Question  Contact: [phone]  [facility], thank you!

## 2020-11-13 ENCOUNTER — HOSPITAL ENCOUNTER (OUTPATIENT)
Dept: CT IMAGING | Age: 50
Discharge: HOME | End: 2020-11-13
Attending: INTERNAL MEDICINE
Payer: MEDICAID

## 2020-11-13 PROCEDURE — 74011000636 HC RX REV CODE- 636: Performed by: INTERNAL MEDICINE

## 2020-11-13 PROCEDURE — 75572 CT HRT W/3D IMAGE: CPT

## 2020-11-13 RX ADMIN — IOPAMIDOL 115 ML: 755 INJECTION, SOLUTION INTRAVENOUS at 14:04

## 2020-11-27 ENCOUNTER — HOSPITAL ENCOUNTER (OUTPATIENT)
Dept: LAB | Age: 50
Discharge: HOME | End: 2020-11-27
Payer: MEDICAID

## 2020-11-27 DIAGNOSIS — I48.0 PAROXYSMAL ATRIAL FIBRILLATION (HCC): ICD-10-CM

## 2020-11-27 LAB
ALBUMIN SERPL-MCNC: 3.7 G/DL (ref 3.4–5)
ALBUMIN/GLOB SERPL: 1 {RATIO} (ref 0.8–1.7)
ALP SERPL-CCNC: 96 U/L (ref 45–117)
ALT SERPL-CCNC: 32 U/L (ref 16–61)
ANION GAP SERPL CALC-SCNC: 7 MMOL/L (ref 3–18)
AST SERPL-CCNC: 20 U/L (ref 10–38)
BASOPHILS # BLD: 0 K/UL (ref 0–0.1)
BASOPHILS NFR BLD: 1 % (ref 0–2)
BILIRUB SERPL-MCNC: 0.4 MG/DL (ref 0.2–1)
BUN SERPL-MCNC: 19 MG/DL (ref 7–18)
BUN/CREAT SERPL: 20 (ref 12–20)
CALCIUM SERPL-MCNC: 8.8 MG/DL (ref 8.5–10.1)
CHLORIDE SERPL-SCNC: 111 MMOL/L (ref 100–111)
CO2 SERPL-SCNC: 25 MMOL/L (ref 21–32)
CREAT SERPL-MCNC: 0.96 MG/DL (ref 0.6–1.3)
DIFFERENTIAL METHOD BLD: NORMAL
EOSINOPHIL # BLD: 0.3 K/UL (ref 0–0.4)
EOSINOPHIL NFR BLD: 3 % (ref 0–5)
ERYTHROCYTE [DISTWIDTH] IN BLOOD BY AUTOMATED COUNT: 13.3 % (ref 11.6–14.5)
GLOBULIN SER CALC-MCNC: 3.8 G/DL (ref 2–4)
GLUCOSE SERPL-MCNC: 88 MG/DL (ref 74–99)
HCT VFR BLD AUTO: 46.3 % (ref 36–48)
HGB BLD-MCNC: 15.9 G/DL (ref 13–16)
INR PPP: 2.4 (ref 0.8–1.2)
LYMPHOCYTES # BLD: 2.5 K/UL (ref 0.9–3.6)
LYMPHOCYTES NFR BLD: 30 % (ref 21–52)
MCH RBC QN AUTO: 31.5 PG (ref 24–34)
MCHC RBC AUTO-ENTMCNC: 34.3 G/DL (ref 31–37)
MCV RBC AUTO: 91.9 FL (ref 74–97)
MONOCYTES # BLD: 0.6 K/UL (ref 0.05–1.2)
MONOCYTES NFR BLD: 8 % (ref 3–10)
NEUTS SEG # BLD: 5 K/UL (ref 1.8–8)
NEUTS SEG NFR BLD: 58 % (ref 40–73)
PLATELET # BLD AUTO: 293 K/UL (ref 135–420)
PMV BLD AUTO: 11.2 FL (ref 9.2–11.8)
POTASSIUM SERPL-SCNC: 4 MMOL/L (ref 3.5–5.5)
PROT SERPL-MCNC: 7.5 G/DL (ref 6.4–8.2)
PROTHROMBIN TIME: 25.5 SEC (ref 11.5–15.2)
RBC # BLD AUTO: 5.04 M/UL (ref 4.7–5.5)
SODIUM SERPL-SCNC: 143 MMOL/L (ref 136–145)
WBC # BLD AUTO: 8.4 K/UL (ref 4.6–13.2)

## 2020-11-27 PROCEDURE — 87635 SARS-COV-2 COVID-19 AMP PRB: CPT

## 2020-11-27 PROCEDURE — 85025 COMPLETE CBC W/AUTO DIFF WBC: CPT

## 2020-11-27 PROCEDURE — 85610 PROTHROMBIN TIME: CPT

## 2020-11-27 PROCEDURE — 36415 COLL VENOUS BLD VENIPUNCTURE: CPT

## 2020-11-27 PROCEDURE — 80053 COMPREHEN METABOLIC PANEL: CPT

## 2020-11-29 LAB — SARS-COV-2, COV2NT: NOT DETECTED

## 2020-12-01 ENCOUNTER — HOSPITAL ENCOUNTER (OUTPATIENT)
Dept: NON INVASIVE DIAGNOSTICS | Age: 50
Discharge: HOME | End: 2020-12-01
Payer: MEDICAID

## 2020-12-01 ENCOUNTER — ANESTHESIA EVENT (OUTPATIENT)
Dept: CARDIAC CATH/INVASIVE PROCEDURES | Age: 50
End: 2020-12-01
Payer: MEDICAID

## 2020-12-01 ENCOUNTER — HOSPITAL ENCOUNTER (OUTPATIENT)
Dept: GENERAL RADIOLOGY | Age: 50
Discharge: HOME | End: 2020-12-01
Attending: EMERGENCY MEDICINE
Payer: MEDICAID

## 2020-12-01 ENCOUNTER — HOSPITAL ENCOUNTER (OUTPATIENT)
Age: 50
Setting detail: OBSERVATION
Discharge: HOME | End: 2020-12-02
Attending: EMERGENCY MEDICINE | Admitting: INTERNAL MEDICINE
Payer: MEDICAID

## 2020-12-01 ENCOUNTER — ANESTHESIA (OUTPATIENT)
Dept: CARDIAC CATH/INVASIVE PROCEDURES | Age: 50
End: 2020-12-01
Payer: MEDICAID

## 2020-12-01 VITALS
OXYGEN SATURATION: 98 % | SYSTOLIC BLOOD PRESSURE: 145 MMHG | RESPIRATION RATE: 22 BRPM | HEART RATE: 74 BPM | WEIGHT: 254 LBS | TEMPERATURE: 98 F | BODY MASS INDEX: 37.62 KG/M2 | HEIGHT: 69 IN | DIASTOLIC BLOOD PRESSURE: 84 MMHG

## 2020-12-01 DIAGNOSIS — R07.9 CHEST PAIN, UNSPECIFIED TYPE: ICD-10-CM

## 2020-12-01 DIAGNOSIS — I48.91 A-FIB (HCC): ICD-10-CM

## 2020-12-01 DIAGNOSIS — I48.91 ATRIAL FIBRILLATION WITH RVR (HCC): Primary | ICD-10-CM

## 2020-12-01 DIAGNOSIS — Z98.890 S/P ABLATION OF ATRIAL FIBRILLATION: ICD-10-CM

## 2020-12-01 DIAGNOSIS — I48.91 AFIB (HCC): ICD-10-CM

## 2020-12-01 DIAGNOSIS — Z86.79 S/P ABLATION OF ATRIAL FIBRILLATION: ICD-10-CM

## 2020-12-01 DIAGNOSIS — E87.6 HYPOKALEMIA: ICD-10-CM

## 2020-12-01 LAB
ACT BLD: 153 SECS (ref 79–138)
ACT BLD: 301 SECS (ref 79–138)
ACT BLD: 345 SECS (ref 79–138)
ALBUMIN SERPL-MCNC: 3.6 G/DL (ref 3.4–5)
ALBUMIN/GLOB SERPL: 0.9 {RATIO} (ref 0.8–1.7)
ALP SERPL-CCNC: 101 U/L (ref 45–117)
ALT SERPL-CCNC: 27 U/L (ref 16–61)
ANION GAP SERPL CALC-SCNC: 8 MMOL/L (ref 3–18)
AST SERPL-CCNC: 19 U/L (ref 10–38)
BASOPHILS # BLD: 0 K/UL (ref 0–0.1)
BASOPHILS NFR BLD: 0 % (ref 0–2)
BILIRUB SERPL-MCNC: 0.7 MG/DL (ref 0.2–1)
BUN SERPL-MCNC: 13 MG/DL (ref 7–18)
BUN/CREAT SERPL: 12 (ref 12–20)
CALCIUM SERPL-MCNC: 9 MG/DL (ref 8.5–10.1)
CHLORIDE SERPL-SCNC: 108 MMOL/L (ref 100–111)
CK MB CFR SERPL CALC: NORMAL % (ref 0–4)
CK MB CFR SERPL CALC: NORMAL % (ref 0–4)
CK MB SERPL-MCNC: <1 NG/ML (ref 5–25)
CK MB SERPL-MCNC: <1 NG/ML (ref 5–25)
CK SERPL-CCNC: 65 U/L (ref 39–308)
CK SERPL-CCNC: 73 U/L (ref 39–308)
CO2 SERPL-SCNC: 24 MMOL/L (ref 21–32)
CREAT SERPL-MCNC: 1.08 MG/DL (ref 0.6–1.3)
DIFFERENTIAL METHOD BLD: ABNORMAL
EOSINOPHIL # BLD: 0.3 K/UL (ref 0–0.4)
EOSINOPHIL NFR BLD: 3 % (ref 0–5)
ERYTHROCYTE [DISTWIDTH] IN BLOOD BY AUTOMATED COUNT: 13.1 % (ref 11.6–14.5)
GLOBULIN SER CALC-MCNC: 4.1 G/DL (ref 2–4)
GLUCOSE SERPL-MCNC: 106 MG/DL (ref 74–99)
HCT VFR BLD AUTO: 45.3 % (ref 36–48)
HGB BLD-MCNC: 16.1 G/DL (ref 13–16)
LYMPHOCYTES # BLD: 3 K/UL (ref 0.9–3.6)
LYMPHOCYTES NFR BLD: 30 % (ref 21–52)
MCH RBC QN AUTO: 32 PG (ref 24–34)
MCHC RBC AUTO-ENTMCNC: 35.5 G/DL (ref 31–37)
MCV RBC AUTO: 90.1 FL (ref 74–97)
MONOCYTES # BLD: 0.9 K/UL (ref 0.05–1.2)
MONOCYTES NFR BLD: 9 % (ref 3–10)
NEUTS SEG # BLD: 5.8 K/UL (ref 1.8–8)
NEUTS SEG NFR BLD: 58 % (ref 40–73)
PLATELET # BLD AUTO: 296 K/UL (ref 135–420)
PMV BLD AUTO: 10.9 FL (ref 9.2–11.8)
POTASSIUM SERPL-SCNC: 3 MMOL/L (ref 3.5–5.5)
POTASSIUM SERPL-SCNC: 4.8 MMOL/L (ref 3.5–5.5)
PROT SERPL-MCNC: 7.7 G/DL (ref 6.4–8.2)
RBC # BLD AUTO: 5.03 M/UL (ref 4.7–5.5)
SODIUM SERPL-SCNC: 140 MMOL/L (ref 136–145)
TROPONIN I SERPL-MCNC: <0.02 NG/ML (ref 0–0.04)
TROPONIN I SERPL-MCNC: <0.02 NG/ML (ref 0–0.04)
WBC # BLD AUTO: 10.1 K/UL (ref 4.6–13.2)

## 2020-12-01 PROCEDURE — C1894 INTRO/SHEATH, NON-LASER: HCPCS | Performed by: INTERNAL MEDICINE

## 2020-12-01 PROCEDURE — 84132 ASSAY OF SERUM POTASSIUM: CPT

## 2020-12-01 PROCEDURE — 96365 THER/PROPH/DIAG IV INF INIT: CPT

## 2020-12-01 PROCEDURE — C1730 CATH, EP, 19 OR FEW ELECT: HCPCS | Performed by: INTERNAL MEDICINE

## 2020-12-01 PROCEDURE — 85347 COAGULATION TIME ACTIVATED: CPT

## 2020-12-01 PROCEDURE — 77030019896 HC KT ARTERIAL LN TELE -B: Performed by: INTERNAL MEDICINE

## 2020-12-01 PROCEDURE — 93656 COMPRE EP EVAL ABLTJ ATR FIB: CPT | Performed by: INTERNAL MEDICINE

## 2020-12-01 PROCEDURE — 93005 ELECTROCARDIOGRAM TRACING: CPT

## 2020-12-01 PROCEDURE — 74011250636 HC RX REV CODE- 250/636: Performed by: EMERGENCY MEDICINE

## 2020-12-01 PROCEDURE — 77030020506 HC NDL TRNSPTL NRG BAYL -F: Performed by: INTERNAL MEDICINE

## 2020-12-01 PROCEDURE — 77030008683 HC TU ET CUF COVD -A: Performed by: ANESTHESIOLOGY

## 2020-12-01 PROCEDURE — 77030030278 HC COAX UMB DISP MEDT -C: Performed by: INTERNAL MEDICINE

## 2020-12-01 PROCEDURE — 93312 ECHO TRANSESOPHAGEAL: CPT | Performed by: INTERNAL MEDICINE

## 2020-12-01 PROCEDURE — 99218 HC RM OBSERVATION: CPT

## 2020-12-01 PROCEDURE — 36415 COLL VENOUS BLD VENIPUNCTURE: CPT

## 2020-12-01 PROCEDURE — 00537 ANES CARDIAC EP PROCEDURES: CPT | Performed by: ANESTHESIOLOGY

## 2020-12-01 PROCEDURE — 99285 EMERGENCY DEPT VISIT HI MDM: CPT

## 2020-12-01 PROCEDURE — 82550 ASSAY OF CK (CPK): CPT

## 2020-12-01 PROCEDURE — 93621 COMP EP EVL L PAC&REC C SINS: CPT | Performed by: INTERNAL MEDICINE

## 2020-12-01 PROCEDURE — 77030008500 HC TBNG CONN PRSS BD -A: Performed by: ANESTHESIOLOGY

## 2020-12-01 PROCEDURE — C1759 CATH, INTRA ECHOCARDIOGRAPHY: HCPCS | Performed by: INTERNAL MEDICINE

## 2020-12-01 PROCEDURE — 93325 DOPPLER ECHO COLOR FLOW MAPG: CPT

## 2020-12-01 PROCEDURE — 36620 INSERTION CATHETER ARTERY: CPT | Performed by: ANESTHESIOLOGY

## 2020-12-01 PROCEDURE — 77030005401 HC CATH RAD ARRO -A: Performed by: ANESTHESIOLOGY

## 2020-12-01 PROCEDURE — 85025 COMPLETE CBC W/AUTO DIFF WBC: CPT

## 2020-12-01 PROCEDURE — 93662 INTRACARDIAC ECG (ICE): CPT

## 2020-12-01 PROCEDURE — 74011250637 HC RX REV CODE- 250/637: Performed by: EMERGENCY MEDICINE

## 2020-12-01 PROCEDURE — 71045 X-RAY EXAM CHEST 1 VIEW: CPT

## 2020-12-01 PROCEDURE — 77030029163 HC CBL EP DX ACHV DISP MEDT -C: Performed by: INTERNAL MEDICINE

## 2020-12-01 PROCEDURE — 74011250637 HC RX REV CODE- 250/637: Performed by: INTERNAL MEDICINE

## 2020-12-01 PROCEDURE — 74011000250 HC RX REV CODE- 250: Performed by: ANESTHESIOLOGY

## 2020-12-01 PROCEDURE — 77030002996 HC SUT SLK J&J -A: Performed by: INTERNAL MEDICINE

## 2020-12-01 PROCEDURE — C1733 CATH, EP, OTHR THAN COOL-TIP: HCPCS | Performed by: INTERNAL MEDICINE

## 2020-12-01 PROCEDURE — C1769 GUIDE WIRE: HCPCS | Performed by: INTERNAL MEDICINE

## 2020-12-01 PROCEDURE — 93653 COMPRE EP EVAL TX SVT: CPT | Performed by: INTERNAL MEDICINE

## 2020-12-01 PROCEDURE — 74011250636 HC RX REV CODE- 250/636: Performed by: ANESTHESIOLOGY

## 2020-12-01 PROCEDURE — 77030013797 HC KT TRNSDUC PRSSR EDWD -A: Performed by: INTERNAL MEDICINE

## 2020-12-01 PROCEDURE — 77030030261 HC CBL UMB ELEC DISP MEDT -C: Performed by: INTERNAL MEDICINE

## 2020-12-01 PROCEDURE — 99223 1ST HOSP IP/OBS HIGH 75: CPT | Performed by: INTERNAL MEDICINE

## 2020-12-01 PROCEDURE — C1893 INTRO/SHEATH, FIXED,NON-PEEL: HCPCS | Performed by: INTERNAL MEDICINE

## 2020-12-01 PROCEDURE — 74011250636 HC RX REV CODE- 250/636: Performed by: INTERNAL MEDICINE

## 2020-12-01 PROCEDURE — 93320 DOPPLER ECHO COMPLETE: CPT | Performed by: INTERNAL MEDICINE

## 2020-12-01 PROCEDURE — 77030012468 HC VLV BLEEDBK CNTRL ABBT -B: Performed by: INTERNAL MEDICINE

## 2020-12-01 PROCEDURE — 2709999900 HC NON-CHARGEABLE SUPPLY: Performed by: INTERNAL MEDICINE

## 2020-12-01 PROCEDURE — 77030013797 HC KT TRNSDUC PRSSR EDWD -A: Performed by: ANESTHESIOLOGY

## 2020-12-01 PROCEDURE — 77030002996 HC SUT SLK J&J -A: Performed by: ANESTHESIOLOGY

## 2020-12-01 PROCEDURE — 74011250636 HC RX REV CODE- 250/636

## 2020-12-01 PROCEDURE — 77030035291 HC TBNG PMP SMARTABLATE J&J -B: Performed by: INTERNAL MEDICINE

## 2020-12-01 PROCEDURE — 76060000036 HC ANESTHESIA 2.5 TO 3 HR: Performed by: INTERNAL MEDICINE

## 2020-12-01 PROCEDURE — 74011000250 HC RX REV CODE- 250: Performed by: INTERNAL MEDICINE

## 2020-12-01 PROCEDURE — 93325 DOPPLER ECHO COLOR FLOW MAPG: CPT | Performed by: INTERNAL MEDICINE

## 2020-12-01 PROCEDURE — 77030018729 HC ELECTRD DEFIB PAD CARD -B: Performed by: INTERNAL MEDICINE

## 2020-12-01 RX ORDER — CEFAZOLIN SODIUM 2 G/50ML
SOLUTION INTRAVENOUS
Status: DISPENSED
Start: 2020-12-01 | End: 2020-12-01

## 2020-12-01 RX ORDER — HEPARIN SODIUM 1000 [USP'U]/ML
INJECTION, SOLUTION INTRAVENOUS; SUBCUTANEOUS AS NEEDED
Status: DISCONTINUED | OUTPATIENT
Start: 2020-12-01 | End: 2020-12-01 | Stop reason: HOSPADM

## 2020-12-01 RX ORDER — NEOSTIGMINE METHYLSULFATE 1 MG/ML
INJECTION, SOLUTION INTRAVENOUS AS NEEDED
Status: DISCONTINUED | OUTPATIENT
Start: 2020-12-01 | End: 2020-12-01 | Stop reason: HOSPADM

## 2020-12-01 RX ORDER — ONDANSETRON 2 MG/ML
4 INJECTION INTRAMUSCULAR; INTRAVENOUS
Status: DISCONTINUED | OUTPATIENT
Start: 2020-12-01 | End: 2020-12-02 | Stop reason: HOSPADM

## 2020-12-01 RX ORDER — METOPROLOL SUCCINATE 25 MG/1
12.5 TABLET, EXTENDED RELEASE ORAL DAILY
Status: DISCONTINUED | OUTPATIENT
Start: 2020-12-02 | End: 2020-12-02 | Stop reason: HOSPADM

## 2020-12-01 RX ORDER — FENTANYL CITRATE 50 UG/ML
INJECTION, SOLUTION INTRAMUSCULAR; INTRAVENOUS AS NEEDED
Status: DISCONTINUED | OUTPATIENT
Start: 2020-12-01 | End: 2020-12-01 | Stop reason: HOSPADM

## 2020-12-01 RX ORDER — ONDANSETRON 2 MG/ML
INJECTION INTRAMUSCULAR; INTRAVENOUS
Status: DISCONTINUED
Start: 2020-12-01 | End: 2020-12-02 | Stop reason: HOSPADM

## 2020-12-01 RX ORDER — FLECAINIDE ACETATE 100 MG/1
50 TABLET ORAL EVERY 12 HOURS
Status: DISCONTINUED | OUTPATIENT
Start: 2020-12-01 | End: 2020-12-02 | Stop reason: HOSPADM

## 2020-12-01 RX ORDER — ONDANSETRON 2 MG/ML
4 INJECTION INTRAMUSCULAR; INTRAVENOUS
Status: DISCONTINUED | OUTPATIENT
Start: 2020-12-01 | End: 2020-12-01

## 2020-12-01 RX ORDER — MIDAZOLAM HYDROCHLORIDE 1 MG/ML
INJECTION, SOLUTION INTRAMUSCULAR; INTRAVENOUS AS NEEDED
Status: DISCONTINUED | OUTPATIENT
Start: 2020-12-01 | End: 2020-12-01 | Stop reason: HOSPADM

## 2020-12-01 RX ORDER — CEFAZOLIN SODIUM 1 G/3ML
INJECTION, POWDER, FOR SOLUTION INTRAMUSCULAR; INTRAVENOUS AS NEEDED
Status: DISCONTINUED | OUTPATIENT
Start: 2020-12-01 | End: 2020-12-01 | Stop reason: HOSPADM

## 2020-12-01 RX ORDER — OXYCODONE AND ACETAMINOPHEN 5; 325 MG/1; MG/1
1 TABLET ORAL
Status: DISCONTINUED | OUTPATIENT
Start: 2020-12-01 | End: 2020-12-02 | Stop reason: HOSPADM

## 2020-12-01 RX ORDER — ONDANSETRON 2 MG/ML
INJECTION INTRAMUSCULAR; INTRAVENOUS AS NEEDED
Status: DISCONTINUED | OUTPATIENT
Start: 2020-12-01 | End: 2020-12-01 | Stop reason: HOSPADM

## 2020-12-01 RX ORDER — POTASSIUM CHLORIDE 7.45 MG/ML
10 INJECTION INTRAVENOUS
Status: COMPLETED | OUTPATIENT
Start: 2020-12-01 | End: 2020-12-01

## 2020-12-01 RX ORDER — PANTOPRAZOLE SODIUM 40 MG/1
40 TABLET, DELAYED RELEASE ORAL
Status: DISCONTINUED | OUTPATIENT
Start: 2020-12-02 | End: 2020-12-02 | Stop reason: HOSPADM

## 2020-12-01 RX ORDER — SUCCINYLCHOLINE CHLORIDE 20 MG/ML
INJECTION INTRAMUSCULAR; INTRAVENOUS AS NEEDED
Status: DISCONTINUED | OUTPATIENT
Start: 2020-12-01 | End: 2020-12-01 | Stop reason: HOSPADM

## 2020-12-01 RX ORDER — LIDOCAINE HYDROCHLORIDE 10 MG/ML
INJECTION, SOLUTION EPIDURAL; INFILTRATION; INTRACAUDAL; PERINEURAL AS NEEDED
Status: DISCONTINUED | OUTPATIENT
Start: 2020-12-01 | End: 2020-12-01 | Stop reason: HOSPADM

## 2020-12-01 RX ORDER — POTASSIUM CHLORIDE 7.45 MG/ML
INJECTION INTRAVENOUS
Status: COMPLETED
Start: 2020-12-01 | End: 2020-12-01

## 2020-12-01 RX ORDER — DEXAMETHASONE SODIUM PHOSPHATE 4 MG/ML
INJECTION, SOLUTION INTRA-ARTICULAR; INTRALESIONAL; INTRAMUSCULAR; INTRAVENOUS; SOFT TISSUE AS NEEDED
Status: DISCONTINUED | OUTPATIENT
Start: 2020-12-01 | End: 2020-12-01 | Stop reason: HOSPADM

## 2020-12-01 RX ORDER — METOPROLOL SUCCINATE 25 MG/1
25 TABLET, EXTENDED RELEASE ORAL
Status: COMPLETED | OUTPATIENT
Start: 2020-12-01 | End: 2020-12-01

## 2020-12-01 RX ORDER — PROTAMINE SULFATE 10 MG/ML
INJECTION, SOLUTION INTRAVENOUS AS NEEDED
Status: DISCONTINUED | OUTPATIENT
Start: 2020-12-01 | End: 2020-12-01 | Stop reason: HOSPADM

## 2020-12-01 RX ORDER — PROPOFOL 10 MG/ML
INJECTION, EMULSION INTRAVENOUS AS NEEDED
Status: DISCONTINUED | OUTPATIENT
Start: 2020-12-01 | End: 2020-12-01 | Stop reason: HOSPADM

## 2020-12-01 RX ORDER — LIDOCAINE HYDROCHLORIDE 20 MG/ML
INJECTION, SOLUTION EPIDURAL; INFILTRATION; INTRACAUDAL; PERINEURAL AS NEEDED
Status: DISCONTINUED | OUTPATIENT
Start: 2020-12-01 | End: 2020-12-01 | Stop reason: HOSPADM

## 2020-12-01 RX ORDER — ROCURONIUM BROMIDE 10 MG/ML
INJECTION, SOLUTION INTRAVENOUS AS NEEDED
Status: DISCONTINUED | OUTPATIENT
Start: 2020-12-01 | End: 2020-12-01 | Stop reason: HOSPADM

## 2020-12-01 RX ORDER — PHENYLEPHRINE HCL IN 0.9% NACL 1 MG/10 ML
SYRINGE (ML) INTRAVENOUS AS NEEDED
Status: DISCONTINUED | OUTPATIENT
Start: 2020-12-01 | End: 2020-12-01 | Stop reason: HOSPADM

## 2020-12-01 RX ADMIN — POTASSIUM CHLORIDE 10 MEQ: 7.46 INJECTION, SOLUTION INTRAVENOUS at 13:10

## 2020-12-01 RX ADMIN — Medication 200 MCG: at 12:29

## 2020-12-01 RX ADMIN — PROTAMINE SULFATE 50 MG: 10 INJECTION, SOLUTION INTRAVENOUS at 12:33

## 2020-12-01 RX ADMIN — ONDANSETRON 4 MG: 2 INJECTION INTRAMUSCULAR; INTRAVENOUS at 12:08

## 2020-12-01 RX ADMIN — Medication 100 MCG: at 12:10

## 2020-12-01 RX ADMIN — METOPROLOL SUCCINATE 12.5 MG: 25 TABLET, EXTENDED RELEASE ORAL at 05:32

## 2020-12-01 RX ADMIN — POTASSIUM CHLORIDE 10 MEQ: 7.46 INJECTION, SOLUTION INTRAVENOUS at 08:15

## 2020-12-01 RX ADMIN — Medication 100 MCG: at 11:22

## 2020-12-01 RX ADMIN — HEPARIN SODIUM 18000 UNITS: 1000 INJECTION, SOLUTION INTRAVENOUS; SUBCUTANEOUS at 11:16

## 2020-12-01 RX ADMIN — FLECAINIDE ACETATE 50 MG: 100 TABLET ORAL at 21:57

## 2020-12-01 RX ADMIN — Medication 100 MCG: at 12:18

## 2020-12-01 RX ADMIN — POTASSIUM CHLORIDE 10 MEQ: 7.46 INJECTION, SOLUTION INTRAVENOUS at 09:59

## 2020-12-01 RX ADMIN — DEXAMETHASONE SODIUM PHOSPHATE 4 MG: 4 INJECTION, SOLUTION INTRAMUSCULAR; INTRAVENOUS at 10:40

## 2020-12-01 RX ADMIN — Medication 3 MG: at 12:14

## 2020-12-01 RX ADMIN — RIVAROXABAN 20 MG: 20 TABLET, FILM COATED ORAL at 17:03

## 2020-12-01 RX ADMIN — PROPOFOL 50 MG: 10 INJECTION, EMULSION INTRAVENOUS at 12:23

## 2020-12-01 RX ADMIN — FENTANYL CITRATE 50 MCG: 50 INJECTION, SOLUTION INTRAMUSCULAR; INTRAVENOUS at 10:10

## 2020-12-01 RX ADMIN — ROCURONIUM BROMIDE 10 MG: 50 INJECTION INTRAVENOUS at 12:05

## 2020-12-01 RX ADMIN — LIDOCAINE HYDROCHLORIDE 40 MG: 20 INJECTION, SOLUTION EPIDURAL; INFILTRATION; INTRACAUDAL; PERINEURAL at 10:10

## 2020-12-01 RX ADMIN — PROPOFOL 200 MG: 10 INJECTION, EMULSION INTRAVENOUS at 10:10

## 2020-12-01 RX ADMIN — ONDANSETRON 4 MG: 2 INJECTION INTRAMUSCULAR; INTRAVENOUS at 13:42

## 2020-12-01 RX ADMIN — SUCCINYLCHOLINE CHLORIDE 160 MG: 20 INJECTION, SOLUTION INTRAMUSCULAR; INTRAVENOUS at 10:10

## 2020-12-01 RX ADMIN — FENTANYL CITRATE 50 MCG: 50 INJECTION, SOLUTION INTRAMUSCULAR; INTRAVENOUS at 11:38

## 2020-12-01 RX ADMIN — MIDAZOLAM HYDROCHLORIDE 2 MG: 2 INJECTION, SOLUTION INTRAMUSCULAR; INTRAVENOUS at 10:10

## 2020-12-01 RX ADMIN — CEFAZOLIN SODIUM 2 G: 1 INJECTION, POWDER, FOR SOLUTION INTRAMUSCULAR; INTRAVENOUS at 10:13

## 2020-12-01 RX ADMIN — ROCURONIUM BROMIDE 40 MG: 50 INJECTION INTRAVENOUS at 10:38

## 2020-12-01 NOTE — PROGRESS NOTES
8: 37 AM  TRANSFER - IN REPORT:    Verbal report received from Gabriela Durand, [facility] (name) on [facility]  being received from ED (unit) for ordered procedure      Report consisted of patients Situation, Background, Assessment and   Recommendations(SBAR). Information from the following report(s) SBAR, Procedure Summary, Intake/Output, MAR and Recent Results was reviewed with the receiving nurse. Opportunity for questions and clarification was provided. Assessment completed upon patients arrival to unit and care assumed. 10:00 AM  TRANSFER - OUT REPORT:    Verbal report given to Duyen Romero (name) on [facility]  being transferred to Cath Lab (unit) for ordered procedure       Report consisted of patients Situation, Background, Assessment and   Recommendations(SBAR). Information from the following report(s) SBAR, Procedure Summary, Intake/Output, MAR and Recent Results was reviewed with the receiving nurse. Lines:   Peripheral IV 12/01/20 Left Antecubital (Active)   Site Assessment Clean, dry, & intact 12/01/20 0917   Phlebitis Assessment 0 12/01/20 0917   Infiltration Assessment 0 12/01/20 0339   Dressing Status Clean, dry, & intact 12/01/20 0917   Dressing Type Transparent 12/01/20 0917   Hub Color/Line Status Pink;Patent 12/01/20 0917   Action Taken Blood drawn 12/01/20 0339   Alcohol Cap Used Yes 12/01/20 0917       Peripheral IV 12/01/20 Right Antecubital (Active)   Site Assessment Clean, dry, & intact 12/01/20 0916   Phlebitis Assessment 0 12/01/20 0916   Dressing Status Clean, dry, & intact 12/01/20 0916   Dressing Type Transparent 12/01/20 0916   Hub Color/Line Status Blue;Patent; Flushed;Capped 12/01/20 0916   Alcohol Cap Used Yes 12/01/20 0916        Opportunity for questions and clarification was provided. Patient transported with:   Tech      1320:    Sheaths and arterial line removed per policy without complication. No bleeding or hematoma.  Hemostatic dressing applied. Wrist splint applied. 1726:    TRANSFER - OUT REPORT:    Verbal report given to Marisa Carrasco RN (name) on Ragini Rush  being transferred to CVT SD (unit) for routine progression of care       Report consisted of patients Situation, Background, Assessment and   Recommendations(SBAR). Information from the following report(s) SBAR, Procedure Summary, Intake/Output, MAR and Recent Results was reviewed with the receiving nurse. Lines:   Peripheral IV 12/01/20 Left Antecubital (Active)   Site Assessment Clean, dry, & intact 12/01/20 0917   Phlebitis Assessment 0 12/01/20 0917   Infiltration Assessment 0 12/01/20 0339   Dressing Status Clean, dry, & intact 12/01/20 0917   Dressing Type Transparent 12/01/20 0917   Hub Color/Line Status Pink;Patent 12/01/20 0917   Action Taken Blood drawn 12/01/20 0339   Alcohol Cap Used Yes 12/01/20 0917       Peripheral IV 12/01/20 Right Antecubital (Active)   Site Assessment Clean, dry, & intact 12/01/20 0916   Phlebitis Assessment 0 12/01/20 0916   Dressing Status Clean, dry, & intact 12/01/20 0916   Dressing Type Transparent 12/01/20 0916   Hub Color/Line Status Blue;Patent; Flushed;Capped 12/01/20 0916   Alcohol Cap Used Yes 12/01/20 0916       Arterial Line 12/01/20 Left Radial artery (Active)        Opportunity for questions and clarification was provided. Patient transported with:   Registered Nurse     Dual site verification performed at bedside.

## 2020-12-01 NOTE — ANESTHESIA PREPROCEDURE EVALUATION
Relevant Problems   No relevant active problems       Anesthetic History   No history of anesthetic complications            Review of Systems / Medical History  Patient summary reviewed and pertinent labs reviewed    Pulmonary          Shortness of breath         Neuro/Psych   Within defined limits           Cardiovascular            Dysrhythmias : atrial fibrillation and atrial flutter      Exercise tolerance: <4 METS  Comments: EF 65%  LVH   GI/Hepatic/Renal               Comments: H/o pancreatitis Endo/Other        Obesity     Other Findings              Physical Exam    Airway  Mallampati: III  TM Distance: > 6 cm  Neck ROM: normal range of motion   Mouth opening: Normal     Cardiovascular    Rhythm: irregular  Rate: normal         Dental  No notable dental hx       Pulmonary  Breath sounds clear to auscultation               Abdominal  GI exam deferred       Other Findings            Anesthetic Plan    ASA: 4  Anesthesia type: general    Monitoring Plan: Arterial line and SHAILESH      Induction: Intravenous  Anesthetic plan and risks discussed with: Patient

## 2020-12-01 NOTE — Clinical Note
Transseptal Cath Performed check box under hemodynamic and ICE and Fluoro, utilizing a extra sharp needle, Beulah 71cm via a guiding sheath. Needle inserted.

## 2020-12-01 NOTE — H&P
[facility] - Consult Note    Consultation request Mariama Dyson for chest pain  Date of  Admission: 12/1/2020  3:37 AM   Primary Care Physician:  LATANYA Pham     Assessment:     -Patient had chest pressure during an episode of A.fib this morning, slightly anxious and resolved. Given the fact it occurred with his usual A.fib, has felt this before, and he had a nuclear stress test in January, will proceed with ablation.  -Paroxysmal symptomatic atrial fibrillation with failure of calcium channel blockers and beta blockers. Presented to ER this Am with recurrent symptomatic atrial fibrillation with HR low 100s. Patient scheduled for elective outpatient ablation today. -Hypokalemia, replace as needed. -Sick sinus syndrome, limiting uptitration of AV blockers  -History of recent sleep study with heart rate down into the 30s. No obvious sleep apnea. -Recent 100 pound weight loss, intentional.  -History of pilonidal cyst surgery, which seemed to be the trigger for his a-fib.  -History of pancreatitis and cholecystitis recently, however he did not have an exacerbation of his atrial fibrillation.  -Family history of father with pacemaker, placed by Dr. Andres Florentino, as well as atrial fibrillation.  -Subcutaneous loop recorder placed 10/20/20. Primary cardiologist Dr. Andres Florentino. Plan:     Patient had chest pressure during an episode of A.fib this morning, confirmed by loop recorder, slightly anxious and resolved. Normal troponin. Given the fact it occurred with his usual A.fib, has felt this before, and he had a nuclear stress test in January, will proceed with ablation. I informed him that the first time success rate for atrial fibrillation ablation ranges between 60%-70%. Some patients will require subsequent re do ablations in later months or years with success rate improving to 80%-90%.     Some patients may still require anti-arrhythmic drug therapy following ablation, and success is defined as afib free from months 3 to 12 after the ablation. The first 3 months is considered \"window period\" for ablation edema to subside and tissue healing, hence, episodes of afib during this period is not considered procedure failure. I have discussed indications, procedures, risks, limitations, and follow up associated with transesophageal echo, electrophysiology study, intracardiac echo, transseptal heart cath, intracardiac mapping, and catheter ablation for atrial fibrillation. Risks included acute respiratory failure, neurovascular injury, soft tissue injury, infection, bleeding, DVT, radiation exposure, atrial septal defect, iv contrast nephropathy, IV contrast allergic reaction, atrial septal defect, perforation, tamponade, potential need for emergent heart surgery, pulmonary vein stenosis that may or may not be amenable to stent placement, phrenic nerve injury/diaphragmatic paralysis that may or may not recover with time, heart block and need for permanent pacing, esophageal injury, dysmotility, or death associated with atrial-esophageal fistula formation, MI, CVA, and death. Reviewed that some of these complications may not be apparent at the time of the procedure. The patient acknowledged these risks and would like to proceed. History of Present Illness: This is a 48 y.o. male admitted for A.fib    Patient complains of:  Palpitations. Patient is a 48year old male with paroxysmal symptomatic atrial fibrillation with failure of calcium channel blockers and beta blockers. Patient was scheduled for outpatient ablation. Patient reports he woke up at 1 AM and had his typical palpitations. Patient reports symptoms did not resolve. Patient went to ER. Patient was in afib with HR low 100s. Patient was treated with Po BB. Patient was noted to have low potassium which is being replaced. Patient ruled out for MI and ECG without ischemic changes.  Patient denies any recent illness with fever, chills, cough, n/v/d. Cardiac risk factors: obesity, male gender      Review of Symptoms:  Except as stated above include:  Constitutional:  negative  Respiratory:  negative  Cardiovascular:  C/o palp. Denies CP, SOB, AURA, syncope. Gastrointestinal: negative  Genitourinary:  negative  Musculoskeletal:  Negative  Neurological:  Negative  Dermatological:  Negative  Endocrinological: Negative  Psychological:  Negative     Past Medical History:     Past Medical History:   Diagnosis Date    [facility]          Social History:     Social History     Socioeconomic History    Marital status: SINGLE     Spouse name: Not on file    Number of children: Not on file    Years of education: Not on file    Highest education level: Not on file   Occupational History    Occupation: cut grass   Tobacco Use    Smoking status: Never Smoker    Smokeless tobacco: Never Used   Substance and Sexual Activity    Alcohol use: Not Currently    Drug use: Never        Family History:     Family History   Problem Relation Age of Onset    Hypertension Father     Prostate Cancer Father         Medications: Allergies   Allergen Reactions    Doxycycline Other (comments)     Tingling all over, vision changes        Current Facility-Administered Medications   Medication Dose Route Frequency    potassium chloride 10 mEq in 100 ml IVPB  10 mEq IntraVENous Q1H     Current Outpatient Medications   Medication Sig    metoprolol succinate (TOPROL-XL) 25 mg XL tablet Take 12.5 mg by mouth daily.  aspirin delayed-release 81 mg tablet Take  by mouth daily.          Physical Exam:     Visit Vitals  /85   Pulse 100   Temp 98.6 °F (37 °C)   Resp 13   SpO2 96%     BP Readings from Last 3 Encounters:   12/01/20 109/85   11/04/20 128/86   10/29/20 (!) 129/91     Pulse Readings from Last 3 Encounters:   12/01/20 100   11/04/20 68   10/29/20 81     Wt Readings from Last 3 Encounters:   11/04/20 249 lb (112.9 kg)   10/29/20 259 lb (117.5 kg) 08/10/20 259 lb 3.2 oz (117.6 kg)       General:  alert, cooperative, no distress, appears stated age  Neck:  no JVD  Lungs:  clear to auscultation bilaterally  Heart:  irregularly irregular rhythm  Abdomen:  abdomen is soft without significant tenderness, masses, organomegaly or guarding  Extremities:  extremities normal, atraumatic, no cyanosis or edema  Skin: Warm and dry.  no hyperpigmentation, vitiligo, or suspicious lesions  Neuro: alert, oriented x3, affect appropriate  Psych: non focal     Data Review:     Recent Labs     12/01/20  0300   WBC 10.1   HGB 16.1*   HCT 45.3        Recent Labs     12/01/20  0300      K 3.0*      CO2 24   *   BUN 13   CREA 1.08   CA 9.0   ALB 3.6   ALT 27       Results for orders placed or performed during the hospital encounter of 12/01/20   EKG, 12 LEAD, INITIAL   Result Value Ref Range    Ventricular Rate 131 BPM    Atrial Rate 357 BPM    QRS Duration 78 ms    Q-T Interval 334 ms    QTC Calculation (Bezet) 493 ms    Calculated R Axis 58 degrees    Calculated T Axis 21 degrees    Diagnosis       Atrial flutter with variable AV block  Nonspecific ST abnormality  Abnormal ECG  No previous ECGs available         All Cardiac Markers in the last 24 hours:    Lab Results   Component Value Date/Time    CPK 65 12/01/2020 05:55 AM    CPK 73 12/01/2020 03:00 AM    CKMB <1.0 12/01/2020 05:55 AM    CKMB <1.0 12/01/2020 03:00 AM    CKND1  12/01/2020 05:55 AM     CALCULATION NOT PERFORMED WHEN RESULT IS BELOW LINEAR LIMIT    CKND1  12/01/2020 03:00 AM     CALCULATION NOT PERFORMED WHEN RESULT IS BELOW LINEAR LIMIT    TROIQ <0.02 12/01/2020 05:55 AM    TROIQ <0.02 12/01/2020 03:00 AM       Last Lipid:  No results found for: CHOL, CHOLX, CHLST, CHOLV, HDL, HDLP, LDL, LDLC, DLDLP, TGLX, TRIGL, TRIGP, CHHD, CHHDX    Signed By: LATANYA Live     December 1, 2020

## 2020-12-01 NOTE — ANESTHESIA POSTPROCEDURE EVALUATION
Procedure(s):  ABLATION A-FIB  W COMPLETE EP STUDY/SHAILESH prior  Lt Atrial Pace & Record During Ep Study. general    Anesthesia Post Evaluation      Multimodal analgesia: multimodal analgesia used between 6 hours prior to anesthesia start to PACU discharge  Patient location during evaluation: PACU  Patient participation: complete - patient participated  Level of consciousness: awake  Pain score: 1  Pain management: adequate  Airway patency: patent  Anesthetic complications: no  Cardiovascular status: acceptable  Respiratory status: acceptable  Hydration status: acceptable  Post anesthesia nausea and vomiting:  none  Final Post Anesthesia Temperature Assessment:  Normothermia (36.0-37.5 degrees C)      INITIAL Post-op Vital signs: No vitals data found for the desired time range.

## 2020-12-01 NOTE — DISCHARGE INSTRUCTIONS
Disposition:  When discharged to home will need follow-up in my office 4 weeks. Please call my office at [phone] if any questions or concerns. Restrictions:  No driving for at least 24 hours after surgery. No heavy lifting > 10 lbs or prolonged standing, walking, or running x 1 week. OK to shower today over incision and remove dressing. Monitor groin for any signs of bleeding and please contact office at [phone] if any issues. There may be some mild bruising which is not unusual.  If any new symptoms develop, such as chest pain, dyspnea, dizziness, please contact office at [phone] immediately. ABLATION DISCHARGE INSTRUCTIONS    It is normal to feel tired the first couple days. Take it easy and follow the physicians instructions. CHECK THE CATHETER INSERTION SITE DAILY:  You may shower 24 hours after the procedure, remove the bandage during showering. Wash with soap and water and pat dry. Gentle cleaning of the site with soap and water is sufficient, cover with a dry clean dressing or bandage. Do not apply creams or powders to the area. Do not sit in a bathtub or pool of water for 7 days or until wound has completely healed. Temporary bruising and discomfort is normal and may last a few weeks. You may have a  formation of a small lump at the site which may last up to 6 weeks. CALL THE PHYSICIAN:  If the site becomes red, swollen or feels warm to the touch  If there is bleeding or drainage or if there is unusual pain at the groin or down the leg. If there is any bleeding, lie down, apply pressure or have someone apply pressure with a clean cloth until the bleeding stops. If the bleeding continues, call 911 to be transported to the hospital.  DO NOT DRIVE YOURSELF, [facility]. Activity:      For the first 24-48 hours or as instructed by the physician:  No lifting, pushing or pulling over 10 pounds and no straining the insertion site.  Do not life grocery bags or the garbage can, do not run the vacuum  or  for 7 days. Start with short walks as in the hospital and gradually increase as tolerated each day. It is recommended to walk 30 minutes 5-7 days per week. Follow your physicians instructions on activity. Avoid walking outside in extremes of heat or cold. Walk inside when it is cold and windy or hot and humid. Things to keep in mind:  No driving for at least 24 hours, or as designated by your physician. Limit the number of times you go up and down the stairs  Take rests and pace yourself with activity. Be careful and do not strain with bowel movements. Medications: Take all medications as prescribed  Call your physician if you have any questions  Keep an updated list of your medications with you at all times and give a list to your physician and pharmacist    Signs and Symptoms:  Be cautious of symptoms of angina or recurrent symptoms such as chest discomfort, unusual shortness of breath or fatigue, palpitations. After Care: Follow up with your physician as instructed. Follow a heart healthy diet with proper portion control, daily stress management, daily exercise, blood pressure and cholesterol control , and smoking cessation. Specific instructions from the emergency physician who treated you in the ER today:  Return to the ER if worse. Do not miss your appointment this morning with Dr. Margareth Gaspar. Please let them know that you are evaluated in the ER and have them check your work-up that was done here before he proceeds with your procedure that you have scheduled for today. Patient Education        Atrial Fibrillation: Care Instructions  Your Care Instructions     Atrial fibrillation is an irregular and often fast heartbeat. Treating this condition is important for several reasons. It can cause blood clots, which can travel from your heart to your brain and cause a stroke.  If you have a fast heartbeat, you may feel lightheaded, dizzy, and weak. An irregular heartbeat can also increase your risk for heart failure. Atrial fibrillation is often the result of another heart condition, such as high blood pressure or coronary artery disease. Making changes to improve your heart condition will help you stay healthy and active. Follow-up care is a key part of your treatment and safety. Be sure to make and go to all appointments, and call your doctor if you are having problems. It's also a good idea to know your test results and keep a list of the medicines you take. How can you care for yourself at home? Medicines    · Take your medicines exactly as prescribed. Call your doctor if you think you are having a problem with your medicine. You will get more details on the specific medicines your doctor prescribes.     · If your doctor has given you a blood thinner to prevent a stroke, be sure you get instructions about how to take your medicine safely. Blood thinners can cause serious bleeding problems.     · Do not take any vitamins, over-the-counter drugs, or herbal products without talking to your doctor first.   Lifestyle changes    · Do not smoke. Smoking can increase your chance of a stroke and heart attack. If you need help quitting, talk to your doctor about stop-smoking programs and medicines. These can increase your chances of quitting for good.     · Eat a heart-healthy diet.     · Stay at a healthy weight. Lose weight if you need to.     · Limit alcohol to 2 drinks a day for men and 1 drink a day for women. Too much alcohol can cause health problems.     · Avoid colds and flu. Get a pneumococcal vaccine shot. If you have had one before, ask your doctor whether you need another dose. Get a flu shot every year. If you must be around people with colds or flu, wash your hands often. Activity    · If your doctor recommends it, get more exercise. Walking is a good choice. Bit by bit, increase the amount you walk every day.  Try for at least 30 minutes on most days of the week. You also may want to swim, bike, or do other activities. Your doctor may suggest that you join a cardiac rehabilitation program so that you can have help increasing your physical activity safely.     · Start light exercise if your doctor says it is okay. Even a small amount will help you get stronger, have more energy, and manage stress. Walking is an easy way to get exercise. Start out by walking a little more than you did in the hospital. Gradually increase the amount you walk.     · When you exercise, watch for signs that your heart is working too hard. You are pushing too hard if you cannot talk while you are exercising. If you become short of breath or dizzy or have chest pain, sit down and rest immediately.     · Check your pulse regularly. Place two fingers on the artery at the palm side of your wrist, in line with your thumb. If your heartbeat seems uneven or fast, talk to your doctor. When should you call for help? Call 911 anytime you think you may need emergency care. For example, call if:    · You have symptoms of a heart attack. These may include:  ? Chest pain or pressure, or a strange feeling in the chest.  ? Sweating. ? Shortness of breath. ? Nausea or vomiting. ? Pain, pressure, or a strange feeling in the back, neck, jaw, or upper belly or in one or both shoulders or arms. ? Lightheadedness or sudden weakness. ? A fast or irregular heartbeat. After you call 911, the  may tell you to chew 1 adult-strength or 2 to 4 low-dose aspirin. Wait for an ambulance. Do not try to drive yourself.     · You have symptoms of a stroke. These may include:  ? Sudden numbness, tingling, weakness, or loss of movement in your face, arm, or leg, especially on only one side of your body. ? Sudden vision changes. ? Sudden trouble speaking. ? Sudden confusion or trouble understanding simple statements. ? Sudden problems with walking or balance. ?  A sudden, severe headache that is different from past headaches.     · You passed out (lost consciousness). Call your doctor now or seek immediate medical care if:    · You have new or increased shortness of breath.     · You feel dizzy or lightheaded, or you feel like you may faint.     · Your heart rate becomes irregular.     · You can feel your heart flutter in your chest or skip heartbeats. Tell your doctor if these symptoms are new or worse. Watch closely for changes in your health, and be sure to contact your doctor if you have any problems. Where can you learn more? Go to http://www.gray.com/  Enter U020 in the search box to learn more about \"Atrial Fibrillation: Care Instructions. \"  Current as of: December 16, 2019               Content Version: 12.6  © 7032-6738 [employer]. Care instructions adapted under license by [employer] (which disclaims liability or warranty for this information). If you have questions about a medical condition or this instruction, always ask your healthcare professional. [address] any warranty or liability for your use of this information. Patient Education        Chest Pain: Care Instructions  Your Care Instructions     There are many things that can cause chest pain. Some are not serious and will get better on their own in a few days. But some kinds of chest pain need more testing and treatment. Your doctor may have recommended a follow-up visit in the next 8 to 12 hours. If you are not getting better, you may need more tests or treatment. Even though your doctor has released you, you still need to watch for any problems. The doctor carefully checked you, but sometimes problems can develop later. If you have new symptoms or if your symptoms do not get better, get medical care right away.   If you have worse or different chest pain or pressure that lasts more than 5 minutes or you passed out (lost consciousness), call 911 or seek other emergency help right away. A medical visit is only one step in your treatment. Even if you feel better, you still need to do what your doctor recommends, such as going to all suggested follow-up appointments and taking medicines exactly as directed. This will help you recover and help prevent future problems. How can you care for yourself at home? · Rest until you feel better. · Take your medicine exactly as prescribed. Call your doctor if you think you are having a problem with your medicine. · Do not drive after taking a prescription pain medicine. When should you call for help? Call 911 if:     · You passed out (lost consciousness).     · You have severe difficulty breathing.     · You have symptoms of a heart attack. These may include:  ? Chest pain or pressure, or a strange feeling in your chest.  ? Sweating. ? Shortness of breath. ? Nausea or vomiting. ? Pain, pressure, or a strange feeling in your back, neck, jaw, or upper belly or in one or both shoulders or arms. ? Lightheadedness or sudden weakness. ? A fast or irregular heartbeat. After you call 911, the  may tell you to chew 1 adult-strength or 2 to 4 low-dose aspirin. Wait for an ambulance. Do not try to drive yourself. Call your doctor today if:     · You have any trouble breathing.     · Your chest pain gets worse.     · You are dizzy or lightheaded, or you feel like you may faint.     · You are not getting better as expected.     · You are having new or different chest pain. Where can you learn more? Go to http://www.BeOnDesk.com/  Enter A120 in the search box to learn more about \"Chest Pain: Care Instructions. \"  Current as of: June 26, 2019               Content Version: 12.6  © 2725-3339 [employer]. Care instructions adapted under license by [employer] (which disclaims liability or warranty for this information).  If you have questions about a medical condition or this instruction, always ask your healthcare professional. [address] any warranty or liability for your use of this information. [employer] Activation    Thank you for requesting access to [employer]. Please follow the instructions below to securely access and download your online medical record. [employer] allows you to send messages to your doctor, view your test results, renew your prescriptions, schedule appointments, and more. How Do I Sign Up? In your internet browser, go to [email or web address]. Click on the First Time User? Click Here link in the Sign In box. You will see the New Member Sign Up page. Enter your [employer] Access Code exactly as it appears below. You will not need to use this code after you´ve completed the sign-up process. If you do not sign up before the expiration date, you must request a new code. [employer] Access Code: [email or web address]  Expires: 3/28/2019  2:27 PM (This is the date your [employer] access code will )    Enter the last four digits of your Social Security Number (xxxx) and Date of Birth (mm/dd/yyyy) as indicated and click Submit. You will be taken to the next sign-up page. Create a [employer] ID. This will be your [employer] login ID and cannot be changed, so think of one that is secure and easy to remember. Create a [employer] password. You can change your password at any time. Enter your Password Reset Question and Answer. This can be used at a later time if you forget your password. Enter your e-mail address. You will receive e-mail notification when new information is available in [address] Click Sign Up. You can now view and download portions of your medical record. Click the [place] link to download a portable copy of your medical information.     Additional Information    If you have questions, please visit the Frequently Asked Questions section of the [employer] website at [email or web address] Remember, [employer] is NOT to be used for urgent needs. For medical emergencies, dial 911.

## 2020-12-01 NOTE — ED PROVIDER NOTES
[facility]  [facility] EMERGENCY DEPT        4:07 AM    Date: 12/1/2020  Patient Name: Shanda Farris    History of Presenting Illness     Chief Complaint   Patient presents with    Chest Pain       48 y.o. male with noted past medical history who presents to the emergency department with palpitations in a patient with A. fib as well as chest pain. The patient states he was in his usual state of health but was supposed to have a procedure done by Dr. Renae Conrad today for ablation for his atrial fibrillation. He states that he was staying with his parents tonight and when he got up he noticed that he had some palpitations like his prior atrial fibrillation and had some associated upper bilateral mid chest discomfort that last about an hour and a half before spontaneous resolution. On initial MD exam the patient denies any palpitations or chest pressure or pain. However he is noted to be in A. fib with RVR. No other associated symptoms. The patient denies recent travel outside [place] and denies recent travel to areas large social gatherings. The patient denies any known history of Covid 19 exposure. Patient denies any other associated signs or symptoms. Patient denies any other complaints. Nursing notes regarding the HPI and triage nursing notes were reviewed. Prior medical records were reviewed. Current Outpatient Medications   Medication Sig Dispense Refill    metoprolol succinate (TOPROL-XL) 25 mg XL tablet Take 12.5 mg by mouth daily.  aspirin delayed-release 81 mg tablet Take  by mouth daily.          Past History     Past Medical History:  Past Medical History:   Diagnosis Date    Afib [facility])        Past Surgical History:  Past Surgical History:   Procedure Laterality Date    HX CHOLECYSTECTOMY      HX CYST REMOVAL      HX HERNIA REPAIR      HX PACEMAKER      Reveal LINQ [id number]       Family History:  Family History   Problem Relation Age of Onset    Hypertension Father     Prostate Cancer Father        Social History:  Social History     Tobacco Use    Smoking status: Never Smoker    Smokeless tobacco: Never Used   Substance Use Topics    Alcohol use: Not Currently    Drug use: Never       Allergies: Allergies   Allergen Reactions    Doxycycline Other (comments)     Tingling all over, vision changes       Patient's primary care provider (as noted in EPIC):  Annabel Willams, [address]    Review of Systems   Constitutional: Negative for diaphoresis. HENT: Negative for congestion. Eyes: Negative for discharge. Respiratory: Negative for shortness of breath, wheezing and stridor. Cardiovascular: Positive for chest pain and palpitations. Negative for leg swelling. Gastrointestinal: Negative for diarrhea. Endocrine: Negative for heat intolerance. Genitourinary: Negative for flank pain. Musculoskeletal: Negative for back pain. Neurological: Negative for weakness. Psychiatric/Behavioral: Negative for hallucinations. All other systems reviewed and are negative. Visit Vitals  BP (!) 132/93 (BP 1 Location: Left arm)   Pulse 81   Temp 98.6 °F (37 °C)   Resp 17   SpO2 99%       PHYSICAL EXAM:    CONSTITUTIONAL:  Alert, in no apparent distress;  well developed;  well nourished. HEAD:  Normocephalic, atraumatic. EYES:  EOMI. Non-icteric sclera. Normal conjunctiva. ENTM:  Nose:  no rhinorrhea. Throat:  no erythema or exudate, mucous membranes moist.  NECK:  No JVD. Supple  RESPIRATORY:  Chest clear, equal breath sounds, good air movement. CARDIOVASCULAR:  No murmurs, rubs, or gallops. Irregularly irregular heart rate consistent with atrial fibrillation. Chest:  No rash, lesions, bruising. No reproducible tenderness to palpation. GI:  Normal bowel sounds, abdomen soft and non-tender. No rebound or guarding. BACK:  Non-tender. UPPER EXT:  Normal inspection. LOWER EXT:  No edema, no calf tenderness.   Distal pulses intact. NEURO:  Moves all four extremities, and grossly normal motor exam.  SKIN:  No rashes;  Normal for age. PSYCH:  Alert and normal affect. DIFFERENTIAL DIAGNOSES/ MEDICAL DECISION MAKING:  Atrial fibrillation from possible cardiac etiology, to include one of a multitule of underlying arrhythmias, presentations as part of an acute cardiac event, including acute myocardial infarction/ acute coronary syndrome, mitral valve prolapse associated chest pain and symptoms, valvular disease, acute alcohol intoxication (holiday heart), underlying sepsis, electrolyte imbalance, endocrine or thyroid axis disorder, dehydration, stress induced, versus numerous other less etiologies (including pulmonary embolus and atrial myxoma) or a combination of the above.     However, in this patient with known atrial fibrillation, this presentation is most likely a recurrence of his underlying atrial fibrillation without a new etiologic event (such as new AMI, etc.)    Diagnostic Study Results     Abnormal lab results from this emergency department encounter:  Labs Reviewed   CBC WITH AUTOMATED DIFF - Abnormal; Notable for the following components:       Result Value    HGB 16.1 (*)     All other components within normal limits   METABOLIC PANEL, COMPREHENSIVE - Abnormal; Notable for the following components:    Potassium 3.0 (*)     Glucose 106 (*)     Globulin 4.1 (*)     All other components within normal limits   CARDIAC PANEL,(CK, CKMB & TROPONIN)   CARDIAC PANEL,(CK, CKMB & TROPONIN)       Lab values for this patient within approximately the last 12 hours:  Recent Results (from the past 12 hour(s))   EKG, 12 LEAD, INITIAL    Collection Time: 12/01/20  2:49 AM   Result Value Ref Range    Ventricular Rate 131 BPM    Atrial Rate 357 BPM    QRS Duration 78 ms    Q-T Interval 334 ms    QTC Calculation (Bezet) 493 ms    Calculated R Axis 58 degrees    Calculated T Axis 21 degrees    Diagnosis       Atrial flutter with variable AV block  Nonspecific ST abnormality  Abnormal ECG  No previous ECGs available     CBC WITH AUTOMATED DIFF    Collection Time: 12/01/20  3:00 AM   Result Value Ref Range    WBC 10.1 4.6 - 13.2 K/uL    RBC 5.03 4.70 - 5.50 M/uL    HGB 16.1 (H) 13.0 - 16.0 g/dL    HCT 45.3 36.0 - 48.0 %    MCV 90.1 74.0 - 97.0 FL    MCH 32.0 24.0 - 34.0 PG    MCHC 35.5 31.0 - 37.0 g/dL    RDW 13.1 11.6 - 14.5 %    PLATELET 773 833 - 457 K/uL    MPV 10.9 9.2 - 11.8 FL    NEUTROPHILS 58 40 - 73 %    LYMPHOCYTES 30 21 - 52 %    MONOCYTES 9 3 - 10 %    EOSINOPHILS 3 0 - 5 %    BASOPHILS 0 0 - 2 %    ABS. NEUTROPHILS 5.8 1.8 - 8.0 K/UL    ABS. LYMPHOCYTES 3.0 0.9 - 3.6 K/UL    ABS. MONOCYTES 0.9 0.05 - 1.2 K/UL    ABS. EOSINOPHILS 0.3 0.0 - 0.4 K/UL    ABS. BASOPHILS 0.0 0.0 - 0.1 K/UL    DF AUTOMATED     METABOLIC PANEL, COMPREHENSIVE    Collection Time: 12/01/20  3:00 AM   Result Value Ref Range    Sodium 140 136 - 145 mmol/L    Potassium 3.0 (L) 3.5 - 5.5 mmol/L    Chloride 108 100 - 111 mmol/L    CO2 24 21 - 32 mmol/L    Anion gap 8 3.0 - 18 mmol/L    Glucose 106 (H) 74 - 99 mg/dL    BUN 13 7.0 - 18 MG/DL    Creatinine 1.08 0.6 - 1.3 MG/DL    BUN/Creatinine ratio 12 12 - 20      GFR est AA >60 >60 ml/min/1.73m2    GFR est non-AA >60 >60 ml/min/1.73m2    Calcium 9.0 8.5 - 10.1 MG/DL    Bilirubin, total 0.7 0.2 - 1.0 MG/DL    ALT (SGPT) 27 16 - 61 U/L    AST (SGOT) 19 10 - 38 U/L    Alk.  phosphatase 101 45 - 117 U/L    Protein, total 7.7 6.4 - 8.2 g/dL    Albumin 3.6 3.4 - 5.0 g/dL    Globulin 4.1 (H) 2.0 - 4.0 g/dL    A-G Ratio 0.9 0.8 - 1.7     CARDIAC PANEL,(CK, CKMB & TROPONIN)    Collection Time: 12/01/20  3:00 AM   Result Value Ref Range    CK - MB <1.0 <3.6 ng/ml    CK-MB Index  0.0 - 4.0 %     CALCULATION NOT PERFORMED WHEN RESULT IS BELOW LINEAR LIMIT    CK 73 39 - 308 U/L    Troponin-I, QT <0.02 0.0 - 0.045 NG/ML   CARDIAC PANEL,(CK, CKMB & TROPONIN)    Collection Time: 12/01/20  5:55 AM   Result Value Ref Range    CK - MB <1.0 <3.6 ng/ml    CK-MB Index  0.0 - 4.0 %     CALCULATION NOT PERFORMED WHEN RESULT IS BELOW LINEAR LIMIT    CK 65 39 - 308 U/L    Troponin-I, QT <0.02 0.0 - 0.045 NG/ML       Radiologist and cardiologist interpretations if available at time of this note:  No results found. Medication(s) ordered for patient during this emergency visit encounter:  Medications   metoprolol succinate (TOPROL-XL) XL tablet 25 mg (12.5 mg Oral Given 12/1/20 0532)       Medical Decision Making     I am the first provider for this patient. I reviewed the vital signs, available nursing notes, past medical history, past surgical history, family history and social history. Vital Signs:  Reviewed the patient's vital signs. EKG:  Atrial fibrillation with ventricular response of 130 bpm.      ED COURSE:    6:42 AM  Second set of cardiac enzymes are normal.    6:44 AM  Patient's atrial fibrillation improved with his home medication that was administered in the ER today. Given his 2 sets of normal enzymes as well as known A. fib, as well as the fact that he supposed to see Dr. Wero Mckay today for a procedure for his afib, will consult his cardiology team.         Coding Diagnoses     Clinical Impression:   1. Atrial fibrillation with RVR ([address]    2. Afib ([address]    3. Chest pain, unspecified type        Disposition     Disposition:  Discharge. Steven Mckeon M.D. SWETHA Board Certified Emergency Physician    Provider Attestation:  If a scribe was utilized in generation of this patient record, I personally performed the services described in the documentation, reviewed the documentation, as recorded by the scribe in my presence, and it accurately records the patient's history of presenting illness, review of systems, patient physical examination, and procedures performed by me as the attending physician. ROCIO Zhang Board Certified Emergency Physician  12/1/2020.  7:01 AM

## 2020-12-01 NOTE — ED TRIAGE NOTES
Patient c/o chest discomfort that started around 1am. Patient stated he has a loop recorder. Patient stated he felt a palpitation then his heart go into an irregular rhythm. Patient is scheduled for a procedure later today cryo balloon ablation.

## 2020-12-01 NOTE — PROGRESS NOTES
TRANSFER - IN REPORT:    Verbal report received from Jennifer(name) on Paige Srinivasan  being received from cath [facility](unit) for routine progression of care      Report consisted of patients Situation, Background, Assessment and   Recommendations(SBAR). A&O x4.   50y. o male ,came for Procedure/Test done: a-fib Ablation. Hx of: a-fib   Allergy: NKA    Resp: on room air 100% saturation,lung sound clear. PIV: on the right AC 22 g & left forearm 20 g  Musculoskeletal:able to walk w/ no problem. Bed rest til 1900. GI: PTA  : urinal  Plan: to continue observation. Information from the following report(s) was reviewed with the receiving nurse. Opportunity for questions and clarification was provided. Patient expected to come up to floor soon. Assessment will be completed upon patients arrival to unit. 1930-Bedside and Verbal shift change report given to SILVIA Ross (oncoming nurse) by Alexy Trujillo RN (offgoing nurse).  Report included the following information SBAR, Kardex, MAR and Recent Results.

## 2020-12-01 NOTE — Clinical Note
TRANSFER - OUT REPORT:     Verbal report given to: Terrance. Report consisted of patient's Situation, Background, Assessment and   Recommendations(SBAR). Opportunity for questions and clarification was provided.

## 2020-12-01 NOTE — PROGRESS NOTES
TRANSFER - IN REPORT:    Verbal report received from RASHIDA Galicia(name) on [facility]  being received from EP lab(unit) for routine post - op      Report consisted of patients Situation, Background, Assessment and   Recommendations(SBAR). Information from the following report(s) SBAR, Procedure Summary, MAR and Recent Results was reviewed with the receiving nurse. Opportunity for questions and clarification was provided. Assessment completed upon patients arrival to unit and care assumed.      14, 7 fr in RFV; 6,9fr RFV   Sinus fabiola @ 41, bp 116/81

## 2020-12-01 NOTE — ED NOTES
Report given to [facility]. Will bring patient over to them as soon as I get K+ running. Patient going to bed 6 in cath holding.

## 2020-12-01 NOTE — ANESTHESIA PROCEDURE NOTES
Arterial Line Placement    Start time: 12/1/2020 10:30 AM  End time: 12/1/2020 10:35 AM  Performed by: Urmila Pearson MD  Authorized by: Urmila Pearson MD     Pre-Procedure  Indications:  Arterial pressure monitoring  Preanesthetic Checklist: patient identified, risks and benefits discussed, anesthesia consent, site marked, patient being monitored, timeout performed and patient being monitored      Procedure:   Prep:  Chlorhexidine  Seldinger Technique?: Yes    Orientation:  Left  Location:  Radial artery  Catheter size:  20 G  Number of attempts:  1    Assessment:   Post-procedure:  Sterile dressing applied

## 2020-12-01 NOTE — Clinical Note
TRANSFER - IN REPORT:     Verbal report received from: [facility]. Report consisted of patient's Situation, Background, Assessment and   Recommendations(SBAR). Opportunity for questions and clarification was provided. Assessment completed upon patient's arrival to unit and care assumed. Patient transported with a Cardiac Cath Tech / Patient Care Tech.

## 2020-12-01 NOTE — ED NOTES
Patient signed out to me to follow the plan from cardiology. Patient was scheduled to have an elective ablation done and came in overnight with a complaint of palpitations and had some improvement with his beta-blockade was found to be mildly hypokalemic. Cardiology would like patient to go to get his ablation done today and will hold his discharge and transfer him to the Cath Lab. I discussed the case with LATANYA Villarreal regarding the plan. Javier May DO 8:04 AM

## 2020-12-02 VITALS
RESPIRATION RATE: 20 BRPM | SYSTOLIC BLOOD PRESSURE: 120 MMHG | TEMPERATURE: 98.2 F | OXYGEN SATURATION: 98 % | DIASTOLIC BLOOD PRESSURE: 76 MMHG | HEART RATE: 68 BPM

## 2020-12-02 LAB
ATRIAL RATE: 357 BPM
ATRIAL RATE: 65 BPM
CALCULATED P AXIS, ECG09: 4 DEGREES
CALCULATED R AXIS, ECG10: 54 DEGREES
CALCULATED R AXIS, ECG10: 58 DEGREES
CALCULATED T AXIS, ECG11: 21 DEGREES
CALCULATED T AXIS, ECG11: 28 DEGREES
DIAGNOSIS, 93000: NORMAL
DIAGNOSIS, 93000: NORMAL
P-R INTERVAL, ECG05: 148 MS
Q-T INTERVAL, ECG07: 334 MS
Q-T INTERVAL, ECG07: 414 MS
QRS DURATION, ECG06: 78 MS
QRS DURATION, ECG06: 86 MS
QTC CALCULATION (BEZET), ECG08: 430 MS
QTC CALCULATION (BEZET), ECG08: 493 MS
VENTRICULAR RATE, ECG03: 131 BPM
VENTRICULAR RATE, ECG03: 65 BPM

## 2020-12-02 PROCEDURE — 99218 HC RM OBSERVATION: CPT

## 2020-12-02 PROCEDURE — 74011250637 HC RX REV CODE- 250/637: Performed by: INTERNAL MEDICINE

## 2020-12-02 PROCEDURE — 2709999900 HC NON-CHARGEABLE SUPPLY

## 2020-12-02 PROCEDURE — 99238 HOSP IP/OBS DSCHRG MGMT 30/<: CPT | Performed by: INTERNAL MEDICINE

## 2020-12-02 RX ORDER — FLECAINIDE ACETATE 50 MG/1
50 TABLET ORAL EVERY 12 HOURS
Qty: 60 TAB | Refills: 3 | Status: SHIPPED | OUTPATIENT
Start: 2020-12-02 | End: 2021-07-01

## 2020-12-02 RX ORDER — PANTOPRAZOLE SODIUM 40 MG/1
40 TABLET, DELAYED RELEASE ORAL
Qty: 30 TAB | Refills: 0 | Status: SHIPPED | OUTPATIENT
Start: 2020-12-02 | End: 2021-01-29 | Stop reason: SDUPTHER

## 2020-12-02 RX ADMIN — PANTOPRAZOLE SODIUM 40 MG: 40 TABLET, DELAYED RELEASE ORAL at 09:34

## 2020-12-02 RX ADMIN — RIVAROXABAN 20 MG: 20 TABLET, FILM COATED ORAL at 09:34

## 2020-12-02 RX ADMIN — FLECAINIDE ACETATE 50 MG: 100 TABLET ORAL at 10:11

## 2020-12-02 RX ADMIN — METOPROLOL SUCCINATE 12.5 MG: 25 TABLET, FILM COATED, EXTENDED RELEASE ORAL at 10:12

## 2020-12-02 NOTE — PROGRESS NOTES
Cardiopulmonary Navigator Recommendations    Discussed with patient benefits of Cardiac/Pulmonary Rehabilitation after discharge. Educational Material provided. Reinforced importance of good nutrition, low sodium diet and monitoring fluid intake, strict medication compliance, daily weights and blood pressure check. Pt is a good candidate for outpatient evaluation. Please consider referring patient for evaluation at OP Cardio Pulmonary Rehabilitation.       Rehabilitation:  Cardiac Rehabilitation      CPAP:   Recommend referring pt for sleep study after discharge  Available Devices:  Kade Timur [address]

## 2020-12-02 NOTE — ACP (ADVANCE CARE PLANNING)
Advance Care Planning     General Advance Care Planning (ACP) Conversation      Date of Conversation: 12/1/2020  Conducted with: Patient with Decision Making Capacity    Healthcare Decision Maker:     Click here to complete [place] including selection of the Healthcare Decision Maker Relationship (ie \"Primary\")  Today we discussed completing an Advance care plan. Pt declined to complete at this time.     Content/Action Overview:   DECLINED ACP conversation - will revisit periodically         Arnol Spencer RN [address] [facility]  [phone]

## 2020-12-02 NOTE — PROGRESS NOTES
2015- Patient asked to ambulate to the bathroom. Prior to allowing patient to get patient sites were checked to ensure that there were no bleeding. Both sites were palpated. There was no visible bleeding and no pain per patient. Patient assisted to get up to go to the  [address] [place] [address]. Once patient started to ambulate the left groin access site started to actively bleeding. Patient was assisted back to the bed and access sites were assessed. The left site gauze was saturated with blood. The guaze was removed, replaced and pressure was placed on site for 20 minutes. Unit charge nurse was able to get quick clot to be placed on site. Quick clot and gauze placed on site with a Tegaderm to hold it in place. Patient will continue to be monitored.

## 2020-12-02 NOTE — PROGRESS NOTES
Current Discharge Medication List      START taking these medications    Details   flecainide (TAMBOCOR) 50 mg tablet Take 1 Tab by mouth every twelve (12) hours. Qty: 60 Tab, Refills: 3      rivaroxaban (XARELTO) 20 mg tab tablet Take 1 Tab by mouth daily. Indications: treatment to prevent blood clots in chronic atrial fibrillation  Qty: 30 Tab, Refills: 3      pantoprazole (PROTONIX) 40 mg tablet Take 1 Tab by mouth Daily (before breakfast). Qty: 30 Tab, Refills: 0         CONTINUE these medications which have NOT CHANGED    Details   metoprolol succinate (TOPROL-XL) 25 mg XL tablet Take 12.5 mg by mouth daily. STOP taking these medications       aspirin delayed-release 81 mg tablet Comments:   Reason for Stopping:           I have reviewed discharge instructions with the patient. The patient verbalized understanding. If you experience chest pain call 911. Do not drive yourself.

## 2020-12-02 NOTE — PROGRESS NOTES
Bedside and Verbal shift change report given to SILVIA Yeager (oncoming nurse) by Sola Mitchell RN (offgoing nurse). Report included the following information SBAR, Kardex and Cardiac Rhythm A-fib .

## 2020-12-02 NOTE — DISCHARGE SUMMARY
[facility]  -  Progress Note      Discharge Summary     Patient: Leon Zheng MRN: [id number]  SSN: [id number]    YOB: 1970  Age: 48 y.o. Sex: male       Admit Date: 12/1/2020    Discharge Date: 12/2/2020      Admission Diagnoses: Atrial fibrillation [facility]) [I48.91]    Discharge Diagnoses:   Problem List as of 12/2/2020 Date Reviewed: 12/1/2020          Codes Class Noted - Resolved    Atrial fibrillation ([address] ICD-10-CM: I48.91  ICD-9-CM: 427.31  12/1/2020 - Present        * (Principal) S/P ablation of atrial fibrillation ICD-10-CM: Z98.890, Z86.79  ICD-9-CM: V45.89  12/1/2020 - Present    Overview Signed 12/1/2020  9:43 AM by Gloria Thomas MD     Cryo 12/1/20             Severe obesity [facility]) ICD-10-CM: E66.01  ICD-9-CM: 278.01  8/10/2020 - Present               Discharge Condition: Good    Hospital Course: Patient was scheduled for elective atrial fibrillation ablation 12/1 but presented to ER with chest pressure during an episode of atrial fibrillation early that morning. Work up was unremarkable. Patient was ultimately transferred to cath lab area where he had successful atrial fibrillation ablation pulmonary vein isolation using [employer] catheter. Patient had A.flutter initially when he came to the lab but converted to SR, if recurrent a.flutter is noted would plan to offer A.flutter ablation. Patient tolerated procedure well. Had some bleeding at right groin upon ambulation last night which resolved, no hematoma or bleeding noted. Patients right wrist without hematoma. Independently seen and evaluated. Agree with above. Patient will be discharged to home later today. He sees [facility] in [place], [place]. He can see electrophysiology as needed. Patient is started on flecainide. Patients pharmacy has this in stock and is being filled this Am for  as discussed with patient.     Consults: None    Significant Diagnostic Studies:     IMPRESSION:   -Successful atrial fibrillation ablation pulmonary vein isolation using [employer] catheter.  -Patient had A.flutter initially when came to lab, converted to Mallie CHRISTINE Saldaña. If recurrent a.flutter, I would offer A.flutter ablation.  -Start flecainide 50 mg BID. -Continue toprol 12.5 mg daily if HR tolerates. -Resume Xarelto today, 3 hours after sheath removal.  -Plan to start proton pump inhibitor x 1 month  -Monitor overnight and discharge tomorrow.  -Follow-up with  6 weeks.     Thank you kindly for allowing me to participate in this patient's care. Please do not hesitate to contact me if any questions. Disposition: home    Discharge Medications:   Current Discharge Medication List      START taking these medications    Details   flecainide (TAMBOCOR) 50 mg tablet Take 1 Tab by mouth every twelve (12) hours. Qty: 60 Tab, Refills: 3      rivaroxaban (XARELTO) 20 mg tab tablet Take 1 Tab by mouth daily. Indications: treatment to prevent blood clots in chronic atrial fibrillation  Qty: 30 Tab, Refills: 3      pantoprazole (PROTONIX) 40 mg tablet Take 1 Tab by mouth Daily (before breakfast). Qty: 30 Tab, Refills: 0         CONTINUE these medications which have NOT CHANGED    Details   metoprolol succinate (TOPROL-XL) 25 mg XL tablet Take 12.5 mg by mouth daily. STOP taking these medications       aspirin delayed-release 81 mg tablet Comments:   Reason for Stopping:       Changed to Xarelto          Activity: Activity as directed  Diet: Cardiac Diet  Wound Care: As directed    Follow-up Appointments   Procedures    FOLLOW UP VISIT Appointment in: Other (Specify) 4-6 weeks Dr. Swetha Herbert     4-6 weeks Dr. Swetha Herbert     Standing Status:   Standing     Number of Occurrences:   1     Order Specific Question:   Appointment in     Answer:    Other (Specify)       Signed By: LATANYA Hsu     December 2, 2020

## 2020-12-02 NOTE — PROGRESS NOTES
Discharge order noted for today. Orders reviewed. Xarelto discount card placed on chart. Prescriptions have been sent to pt's home pharmacy. No additional needs identified at this time. Observation notice provided in writing to patient and/or caregiver as well as verbal explanation of the policy. Patients who are in outpatient status also receive the Observation notice. Pt to be transported home by his mother Bobbi Hackett.  remains available if needed.     Wally De Paz RN BSN  Care Manager  [phone]

## 2021-01-06 ENCOUNTER — OFFICE VISIT (OUTPATIENT)
Dept: CARDIOLOGY CLINIC | Age: 51
End: 2021-01-06
Payer: MEDICAID

## 2021-01-06 VITALS
SYSTOLIC BLOOD PRESSURE: 132 MMHG | HEIGHT: 69 IN | WEIGHT: 250 LBS | HEART RATE: 73 BPM | BODY MASS INDEX: 37.03 KG/M2 | DIASTOLIC BLOOD PRESSURE: 84 MMHG | OXYGEN SATURATION: 99 %

## 2021-01-06 DIAGNOSIS — Z95.9 CARDIAC DEVICE IN SITU: ICD-10-CM

## 2021-01-06 DIAGNOSIS — I48.0 PAROXYSMAL ATRIAL FIBRILLATION (HCC): Primary | ICD-10-CM

## 2021-01-06 DIAGNOSIS — Z09 HOSPITAL DISCHARGE FOLLOW-UP: ICD-10-CM

## 2021-01-06 DIAGNOSIS — Z98.890 S/P ABLATION OF ATRIAL FIBRILLATION: ICD-10-CM

## 2021-01-06 DIAGNOSIS — I48.3 TYPICAL ATRIAL FLUTTER (HCC): ICD-10-CM

## 2021-01-06 DIAGNOSIS — E66.01 SEVERE OBESITY (HCC): ICD-10-CM

## 2021-01-06 DIAGNOSIS — Z86.79 S/P ABLATION OF ATRIAL FIBRILLATION: ICD-10-CM

## 2021-01-06 PROCEDURE — 1111F DSCHRG MED/CURRENT MED MERGE: CPT | Performed by: INTERNAL MEDICINE

## 2021-01-06 PROCEDURE — 99215 OFFICE O/P EST HI 40 MIN: CPT | Performed by: INTERNAL MEDICINE

## 2021-01-06 RX ORDER — SODIUM CHLORIDE 0.9 % (FLUSH) 0.9 %
5-40 SYRINGE (ML) INJECTION EVERY 8 HOURS
Status: CANCELLED | OUTPATIENT
Start: 2021-01-06

## 2021-01-06 RX ORDER — SODIUM CHLORIDE 0.9 % (FLUSH) 0.9 %
5-40 SYRINGE (ML) INJECTION AS NEEDED
Status: CANCELLED | OUTPATIENT
Start: 2021-01-06

## 2021-01-06 NOTE — PROGRESS NOTES
Gallo Revels presents today for   Chief Complaint   Patient presents with   [facility] Follow Up     4 week follow up post Afib Ablation       Gallo Hernandez preferred language for health care discussion is english/other. Is someone accompanying this pt? no    Is the patient using any DME equipment during [address]? no    Depression Screening:  3 most recent PHQ Screens 1/6/2021   Little interest or pleasure in doing things Not at all   Feeling down, depressed, irritable, or hopeless Not at all   Total Score PHQ 2 0       Learning Assessment:  Learning Assessment 1/6/2021   PRIMARY LEARNER Patient   PRIMARY LANGUAGE ENGLISH   LEARNER PREFERENCE PRIMARY DEMONSTRATION   ANSWERED BY patient   RELATIONSHIP SELF       Abuse Screening:  Abuse Screening Questionnaire 1/6/2021   Do you ever feel afraid of your partner? N   Are you in a relationship with someone who physically or mentally threatens you? N   Is it safe for you to go home? Y       Fall Risk  Fall Risk Assessment, last 12 mths 1/6/2021   Able to walk? Yes   Fall in past 12 months? 0   Do you feel unsteady? 0   Are you worried about falling 0       Pt currently taking Anticoagulant therapy? Xarelto 20 mg once a day    Coordination of Care:  1. Have you been to the ER, urgent care clinic since your last visit? Hospitalized since your last visit? no    2. Have you seen or consulted any other health care providers outside of the [address] [place] [address] since your last visit? Include any pap smears or colon screening.  no

## 2021-01-06 NOTE — PATIENT INSTRUCTIONS
[facility]          Patient  EP Instructions                  1. You are scheduled to have a SHAILESH and Possible Aflutter Ablation on  January 29 , 2021 , at 0800 am.    Please check in at 0700 am.    2. Please go to [facility] and lex in the outpatient parking lot that is located around to the back of the hospital and enter through the [employer]. Once you enter through the [facility] check in with the  there. The  will either give you directions or assist you in getting to the cath holding area. 3.  You are not to eat or drink anything after midnight the night before your procedure. 4. Please continue to take your medications with a small sip of water on the morning of the procedure with the following exceptions:  Continue your medications. 5. If you are diabetic, do not take your insulin/sugar pill the morning of the procedure. 6. We encourage families to wait in the waiting room on the first floor while the procedure is being done. The Doctor will come out and talk with you as soon as the procedure is over. 7. There is the possibility that you may spend the night in the hospital, depending on the results of the procedure. This will be determined after the procedure is done. 8.   If you or your family have any questions, please call our office Monday-Friday 9:00am         -4:30 pm , at [phone], and ask to speak to one of the nurses.

## 2021-01-06 NOTE — PROGRESS NOTES
I have personally seen and evaluated the device findings. Interrogation reviewed and I agree with assessment. Lady Mason  Reviewed day of apt.

## 2021-01-06 NOTE — PROGRESS NOTES
History of Present Illness:  48year old male here for follow up. He underwent atrial fibrillation ablation with cryo on the left sided pulmonary veins 12/01/20. He actually presented to the hospital the night before, he was in flutter and degenerated. He has been doing better, but he is still having some episodes where he feels his heart racing. His fatigue and energy is improved, but he again is still having some breakthrough episodes. No new chest pain or syncope. He did have some bleeding the night after his ablation, likely due to heparin. Impression:  1. Paroxysmal symptomatic atrial fibrillation with failure of calcium channel blockers and beta blockers. 2. Breakthrough atrial flutter by recent monitor evaluation. He was in atrial flutter when he presented to the cath lab December 1st.  3. History of recent sleep study with heart rate down to 30s, but no obvious sleep apnea. 4. History of recent 100 pound weight loss, intentional.  5. Subcutaneous loop recorder placed October, 2020.  6. History of pilonidal cyst surgery remotely. 7. History of pancreatitis and cholecystitis last year, however it did not seem to exacerbate his atrial fibrillation. 6. Family history of father with pacemaker, which I placed, as well as atrial fibrillation. Plan:  His device now appears to show an atrial flutter with increased ventricular rates at times. I suspect this is the cause of his symptoms. I discussed it can take a full three months before we see complete healing from his atrial fibrillation ablation. Given his symptoms and desire to improve his lifestyle, we will plan flutter ablation. Risks, benefits and alternatives discussed, all questions answered. Past Medical History:   Diagnosis Date    Afib [facility])        Current Outpatient Medications   Medication Sig Dispense Refill    flecainide (TAMBOCOR) 50 mg tablet Take 1 Tab by mouth every twelve (12) hours.  60 Tab 3    rivaroxaban (XARELTO) 20 mg tab tablet Take 1 Tab by mouth daily. Indications: treatment to prevent blood clots in chronic atrial fibrillation 30 Tab 3    pantoprazole (PROTONIX) 40 mg tablet Take 1 Tab by mouth Daily (before breakfast). 30 Tab 0    metoprolol succinate (TOPROL-XL) 25 mg XL tablet Take 12.5 mg by mouth daily. Social History   reports that he has never smoked. He has never used smokeless tobacco.   reports previous alcohol use. Family History  family history includes Hypertension in his father; Prostate Cancer in his father. Review of Systems  Except as stated above include:  Constitutional: Negative for fever, chills and malaise/fatigue. HEENT: No congestion or recent URI. Gastrointestinal: No nausea, vomiting, abdominal pain, bloody stools. Pulmonary:  Negative except as stated above. Cardiac:  Negative except as stated above. Musculoskeletal: Negative except as stated above. Neurological:  No localized symptoms. Skin:  Negative except as stated above. Psych:  Negative except as stated above. Endocrine:  Negative except as stated above. PHYSICAL EXAM  BP Readings from Last 3 Encounters:   01/06/21 132/84   12/02/20 120/76   12/01/20 (!) 145/84     Pulse Readings from Last 3 Encounters:   01/06/21 73   12/02/20 68   12/01/20 74     Wt Readings from Last 3 Encounters:   01/06/21 113.4 kg (250 lb)   12/01/20 115.2 kg (254 lb)   11/04/20 112.9 kg (249 lb)     General:   Well developed, well groomed. Head/Neck:   No obvious jugular venous distention     No obvious carotid pulsations. No evidence of xanthelasma. Lungs:   No respiratory distress. Clear bilaterally. Heart:  Regular rate and rhythm. Normal S1/S2. Palpation grossly normal.    No significant murmurs, rubs or gallops. Loop recorder in place. Abdomen:   Non-acute abdomen. No obvious pulsations. Extremities:   Intact peripheral pulses. No significant edema.     Neurological:   Alert and oriented to person, place, time. No focal neurological deficit visually. Skin:   No obvious rash    Blood Pressure Metric:  Monitor recommended and adjustments stated if needed.

## 2021-01-08 ENCOUNTER — TRANSCRIBE ORDER (OUTPATIENT)
Dept: CARDIAC CATH/INVASIVE PROCEDURES | Age: 51
End: 2021-01-08

## 2021-01-08 DIAGNOSIS — I48.92 ATRIAL FLUTTER (HCC): Primary | ICD-10-CM

## 2021-01-14 ENCOUNTER — DOCUMENTATION ONLY (OUTPATIENT)
Dept: CARDIOLOGY CLINIC | Age: 51
End: 2021-01-14

## 2021-01-14 NOTE — PROGRESS NOTES
[facility] Prior Authorization For SHAILESH On 1/29/21 With Dr. Vita Hidalgo.  # [id number]    Valid:  1/13/21 to 3/13/21        No Prior Authorization Required For AFlutter Ablation
Statement Selected

## 2021-01-18 DIAGNOSIS — I48.0 PAROXYSMAL ATRIAL FIBRILLATION (HCC): ICD-10-CM

## 2021-01-25 ENCOUNTER — HOSPITAL ENCOUNTER (OUTPATIENT)
Dept: LAB | Age: 51
Discharge: HOME | End: 2021-01-25

## 2021-01-25 ENCOUNTER — HOSPITAL ENCOUNTER (OUTPATIENT)
Dept: LAB | Age: 51
End: 2021-01-25
Payer: MEDICAID

## 2021-01-25 ENCOUNTER — HOSPITAL ENCOUNTER (OUTPATIENT)
Dept: GENERAL RADIOLOGY | Age: 51
Discharge: HOME | End: 2021-01-25
Payer: MEDICAID

## 2021-01-25 DIAGNOSIS — I48.0 PAROXYSMAL ATRIAL FIBRILLATION (HCC): ICD-10-CM

## 2021-01-25 LAB
XX-LABCORP SPECIMEN COL,LCBCF: NORMAL
XX-LABCORP SPECIMEN COL,LCBCF: NORMAL

## 2021-01-25 PROCEDURE — 99001 SPECIMEN HANDLING PT-LAB: CPT

## 2021-01-25 PROCEDURE — 71046 X-RAY EXAM CHEST 2 VIEWS: CPT

## 2021-01-25 NOTE — H&P
Plan SHAILESH and possible right sided atrial flutter ablation. History of Present Illness:  48year old male here for follow up. He underwent atrial fibrillation ablation with cryo on the left sided pulmonary veins 12/01/20. He actually presented to the hospital the night before, he was in flutter and degenerated. He has been doing better, but he is still having some episodes where he feels his heart racing. His fatigue and energy is improved, but he again is still having some breakthrough episodes. No new chest pain or syncope. He did have some bleeding the night after his ablation, likely due to heparin.     Impression:  1. Paroxysmal symptomatic atrial fibrillation with failure of calcium channel blockers and beta blockers. S/p atrial fibrillation PVI ablation 122/2/20. 2. Breakthrough atrial flutter by recent monitor evaluation. He was in atrial flutter when he presented to the cath lab December 1st.  3. History of recent sleep study with heart rate down to 30s, but no obvious sleep apnea. 4. History of recent 100 pound weight loss, intentional.  5. Subcutaneous loop recorder placed October, 2020.  6. History of pilonidal cyst surgery remotely. 7. History of pancreatitis and cholecystitis last year, however it did not seem to exacerbate his atrial fibrillation. 6. Family history of father with pacemaker, which I placed, as well as atrial fibrillation.     Plan:  His device now appears to show an atrial flutter with increased ventricular rates at times. I suspect this is the cause of his symptoms. I discussed it can take a full three months before we see complete healing from his atrial fibrillation ablation. Given his symptoms and desire to improve his lifestyle, we will plan flutter ablation.   Risks, benefits and alternatives discussed, all questions answered.             Past Medical History:   Diagnosis Date    Afib [facility])                  Current Outpatient Medications   Medication Sig Dispense Refill    flecainide (TAMBOCOR) 50 mg tablet Take 1 Tab by mouth every twelve (12) hours. 60 Tab 3    rivaroxaban (XARELTO) 20 mg tab tablet Take 1 Tab by mouth daily. Indications: treatment to prevent blood clots in chronic atrial fibrillation 30 Tab 3    pantoprazole (PROTONIX) 40 mg tablet Take 1 Tab by mouth Daily (before breakfast). 30 Tab 0    metoprolol succinate (TOPROL-XL) 25 mg XL tablet Take 12.5 mg by mouth daily.             Social History   reports that he has never smoked. He has never used smokeless tobacco.   reports previous alcohol use.     Family History  family history includes Hypertension in his father; Prostate Cancer in his father.     Review of Systems  Except as stated above include:  Constitutional: Negative for fever, chills and malaise/fatigue. HEENT: No congestion or recent URI. Gastrointestinal: No nausea, vomiting, abdominal pain, bloody stools. Pulmonary:  Negative except as stated above. Cardiac:  Negative except as stated above. Musculoskeletal: Negative except as stated above. Neurological:  No localized symptoms. Skin:  Negative except as stated above. Psych:  Negative except as stated above. Endocrine:  Negative except as stated above.     PHYSICAL EXAM      BP Readings from Last 3 Encounters:   01/06/21 132/84   12/02/20 120/76   12/01/20 (!) 145/84          Pulse Readings from Last 3 Encounters:   01/06/21 73   12/02/20 68   12/01/20 74          Wt Readings from Last 3 Encounters:   01/06/21 113.4 kg (250 lb)   12/01/20 115.2 kg (254 lb)   11/04/20 112.9 kg (249 lb)      General:          Well developed, well groomed. Head/Neck:     No obvious jugular venous distention                           No obvious carotid pulsations. No evidence of xanthelasma. Lungs:             No respiratory distress. Clear bilaterally. Heart:              Regular rate and rhythm. Normal S1/S2. Palpation grossly normal.                          No significant murmurs, rubs or gallops. Loop recorder in place. Abdomen:        Non-acute abdomen. No obvious pulsations. Extremities:     Intact peripheral pulses. No significant edema. Neurological:   Alert and oriented to person, place, time. No focal neurological deficit visually.   Skin:                No obvious rash     Blood Pressure Metric:  Monitor recommended and adjustments stated if needed.         Electronically signed by Wilber Shepard MD at 01/06/21 9964

## 2021-01-26 LAB
ALBUMIN SERPL-MCNC: 4.1 G/DL (ref 4–5)
ALBUMIN/GLOB SERPL: 1.3 {RATIO} (ref 1.2–2.2)
ALP SERPL-CCNC: 105 IU/L (ref 39–117)
ALT SERPL-CCNC: 21 IU/L (ref 0–44)
AST SERPL-CCNC: 21 IU/L (ref 0–40)
BASOPHILS # BLD AUTO: 0.1 X10E3/UL (ref 0–0.2)
BASOPHILS NFR BLD AUTO: 1 %
BILIRUB SERPL-MCNC: 0.4 MG/DL (ref 0–1.2)
BUN SERPL-MCNC: 14 MG/DL (ref 6–24)
BUN/CREAT SERPL: 13 (ref 9–20)
CALCIUM SERPL-MCNC: 9 MG/DL (ref 8.7–10.2)
CHLORIDE SERPL-SCNC: 105 MMOL/L (ref 96–106)
CO2 SERPL-SCNC: 21 MMOL/L (ref 20–29)
CREAT SERPL-MCNC: 1.07 MG/DL (ref 0.76–1.27)
EOSINOPHIL # BLD AUTO: 0.3 X10E3/UL (ref 0–0.4)
EOSINOPHIL NFR BLD AUTO: 4 %
ERYTHROCYTE [DISTWIDTH] IN BLOOD BY AUTOMATED COUNT: 12.3 % (ref 11.6–15.4)
GLOBULIN SER CALC-MCNC: 3.2 G/DL (ref 1.5–4.5)
GLUCOSE SERPL-MCNC: 86 MG/DL (ref 65–99)
HCT VFR BLD AUTO: 50 % (ref 37.5–51)
HGB BLD-MCNC: 17.2 G/DL (ref 13–17.7)
IMM GRANULOCYTES # BLD AUTO: 0 X10E3/UL (ref 0–0.1)
IMM GRANULOCYTES NFR BLD AUTO: 0 %
INR PPP: 1.3 (ref 0.9–1.2)
LYMPHOCYTES # BLD AUTO: 2.2 X10E3/UL (ref 0.7–3.1)
LYMPHOCYTES NFR BLD AUTO: 38 %
MCH RBC QN AUTO: 31.5 PG (ref 26.6–33)
MCHC RBC AUTO-ENTMCNC: 34.4 G/DL (ref 31.5–35.7)
MCV RBC AUTO: 92 FL (ref 79–97)
MONOCYTES # BLD AUTO: 0.5 X10E3/UL (ref 0.1–0.9)
MONOCYTES NFR BLD AUTO: 8 %
NEUTROPHILS # BLD AUTO: 2.8 X10E3/UL (ref 1.4–7)
NEUTROPHILS NFR BLD AUTO: 49 %
PLATELET # BLD AUTO: 297 X10E3/UL (ref 150–450)
POTASSIUM SERPL-SCNC: 4.7 MMOL/L (ref 3.5–5.2)
PROT SERPL-MCNC: 7.3 G/DL (ref 6–8.5)
PROTHROMBIN TIME: 13.9 SEC (ref 9.1–12)
RBC # BLD AUTO: 5.46 X10E6/UL (ref 4.14–5.8)
SODIUM SERPL-SCNC: 142 MMOL/L (ref 134–144)
WBC # BLD AUTO: 5.9 X10E3/UL (ref 3.4–10.8)

## 2021-01-27 LAB
SARS-COV-2, NAA: NOT DETECTED
SPECIMEN STATUS REPORT, ROLRST: NORMAL

## 2021-01-29 ENCOUNTER — HOSPITAL ENCOUNTER (OUTPATIENT)
Dept: NON INVASIVE DIAGNOSTICS | Age: 51
Discharge: HOME | End: 2021-01-29
Attending: INTERNAL MEDICINE
Payer: MEDICAID

## 2021-01-29 ENCOUNTER — HOSPITAL ENCOUNTER (OUTPATIENT)
Age: 51
Setting detail: OUTPATIENT SURGERY
Discharge: HOME | End: 2021-01-29
Attending: INTERNAL MEDICINE | Admitting: INTERNAL MEDICINE
Payer: MEDICAID

## 2021-01-29 ENCOUNTER — ANESTHESIA EVENT (OUTPATIENT)
Dept: CARDIAC CATH/INVASIVE PROCEDURES | Age: 51
End: 2021-01-29
Payer: MEDICAID

## 2021-01-29 ENCOUNTER — ANESTHESIA (OUTPATIENT)
Dept: CARDIAC CATH/INVASIVE PROCEDURES | Age: 51
End: 2021-01-29
Payer: MEDICAID

## 2021-01-29 VITALS
HEIGHT: 69 IN | SYSTOLIC BLOOD PRESSURE: 132 MMHG | DIASTOLIC BLOOD PRESSURE: 84 MMHG | BODY MASS INDEX: 37.03 KG/M2 | WEIGHT: 250 LBS

## 2021-01-29 VITALS
WEIGHT: 253 LBS | SYSTOLIC BLOOD PRESSURE: 115 MMHG | HEART RATE: 116 BPM | TEMPERATURE: 98.6 F | OXYGEN SATURATION: 98 % | HEIGHT: 69 IN | DIASTOLIC BLOOD PRESSURE: 89 MMHG | BODY MASS INDEX: 37.47 KG/M2 | RESPIRATION RATE: 12 BRPM

## 2021-01-29 DIAGNOSIS — I48.92 ATRIAL FLUTTER (HCC): ICD-10-CM

## 2021-01-29 DIAGNOSIS — Z98.890 S/P ABLATION OF ATRIAL FIBRILLATION: Primary | ICD-10-CM

## 2021-01-29 DIAGNOSIS — Z98.890 S/P ABLATION OF ATRIAL FLUTTER: ICD-10-CM

## 2021-01-29 DIAGNOSIS — Z86.79 S/P ABLATION OF ATRIAL FLUTTER: ICD-10-CM

## 2021-01-29 DIAGNOSIS — Z86.79 S/P ABLATION OF ATRIAL FIBRILLATION: Primary | ICD-10-CM

## 2021-01-29 PROCEDURE — 76060000034 HC ANESTHESIA 1.5 TO 2 HR: Performed by: INTERNAL MEDICINE

## 2021-01-29 PROCEDURE — 93621 COMP EP EVL L PAC&REC C SINS: CPT | Performed by: INTERNAL MEDICINE

## 2021-01-29 PROCEDURE — 00537 ANES CARDIAC EP PROCEDURES: CPT | Performed by: ANESTHESIOLOGY

## 2021-01-29 PROCEDURE — 93613 INTRACARDIAC EPHYS 3D MAPG: CPT | Performed by: INTERNAL MEDICINE

## 2021-01-29 PROCEDURE — 74011000250 HC RX REV CODE- 250: Performed by: INTERNAL MEDICINE

## 2021-01-29 PROCEDURE — 93320 DOPPLER ECHO COMPLETE: CPT | Performed by: INTERNAL MEDICINE

## 2021-01-29 PROCEDURE — C1730 CATH, EP, 19 OR FEW ELECT: HCPCS | Performed by: INTERNAL MEDICINE

## 2021-01-29 PROCEDURE — 93312 ECHO TRANSESOPHAGEAL: CPT | Performed by: INTERNAL MEDICINE

## 2021-01-29 PROCEDURE — 74011250636 HC RX REV CODE- 250/636: Performed by: ANESTHESIOLOGY

## 2021-01-29 PROCEDURE — C1894 INTRO/SHEATH, NON-LASER: HCPCS | Performed by: INTERNAL MEDICINE

## 2021-01-29 PROCEDURE — 77030027107 HC PTCH EXT REF CARTO3 J&J -F: Performed by: INTERNAL MEDICINE

## 2021-01-29 PROCEDURE — 77030035291 HC TBNG PMP SMARTABLATE J&J -B: Performed by: INTERNAL MEDICINE

## 2021-01-29 PROCEDURE — 74011000250 HC RX REV CODE- 250: Performed by: ANESTHESIOLOGY

## 2021-01-29 PROCEDURE — 96374 THER/PROPH/DIAG INJ IV PUSH: CPT

## 2021-01-29 PROCEDURE — 93325 DOPPLER ECHO COLOR FLOW MAPG: CPT | Performed by: INTERNAL MEDICINE

## 2021-01-29 PROCEDURE — C1732 CATH, EP, DIAG/ABL, 3D/VECT: HCPCS | Performed by: INTERNAL MEDICINE

## 2021-01-29 PROCEDURE — 93653 COMPRE EP EVAL TX SVT: CPT | Performed by: INTERNAL MEDICINE

## 2021-01-29 PROCEDURE — 77030018729 HC ELECTRD DEFIB PAD CARD -B: Performed by: INTERNAL MEDICINE

## 2021-01-29 RX ORDER — SUCCINYLCHOLINE CHLORIDE 20 MG/ML
INJECTION INTRAMUSCULAR; INTRAVENOUS AS NEEDED
Status: DISCONTINUED | OUTPATIENT
Start: 2021-01-29 | End: 2021-01-29 | Stop reason: HOSPADM

## 2021-01-29 RX ORDER — MIDAZOLAM HYDROCHLORIDE 1 MG/ML
INJECTION, SOLUTION INTRAMUSCULAR; INTRAVENOUS AS NEEDED
Status: DISCONTINUED | OUTPATIENT
Start: 2021-01-29 | End: 2021-01-29 | Stop reason: HOSPADM

## 2021-01-29 RX ORDER — ONDANSETRON 2 MG/ML
INJECTION INTRAMUSCULAR; INTRAVENOUS AS NEEDED
Status: DISCONTINUED | OUTPATIENT
Start: 2021-01-29 | End: 2021-01-29 | Stop reason: HOSPADM

## 2021-01-29 RX ORDER — OXYCODONE AND ACETAMINOPHEN 5; 325 MG/1; MG/1
1 TABLET ORAL
Status: DISCONTINUED | OUTPATIENT
Start: 2021-01-29 | End: 2021-01-29 | Stop reason: HOSPADM

## 2021-01-29 RX ORDER — DEXAMETHASONE SODIUM PHOSPHATE 4 MG/ML
INJECTION, SOLUTION INTRA-ARTICULAR; INTRALESIONAL; INTRAMUSCULAR; INTRAVENOUS; SOFT TISSUE AS NEEDED
Status: DISCONTINUED | OUTPATIENT
Start: 2021-01-29 | End: 2021-01-29 | Stop reason: HOSPADM

## 2021-01-29 RX ORDER — PROPOFOL 10 MG/ML
INJECTION, EMULSION INTRAVENOUS AS NEEDED
Status: DISCONTINUED | OUTPATIENT
Start: 2021-01-29 | End: 2021-01-29 | Stop reason: HOSPADM

## 2021-01-29 RX ORDER — LIDOCAINE HYDROCHLORIDE 10 MG/ML
INJECTION, SOLUTION EPIDURAL; INFILTRATION; INTRACAUDAL; PERINEURAL AS NEEDED
Status: DISCONTINUED | OUTPATIENT
Start: 2021-01-29 | End: 2021-01-29 | Stop reason: HOSPADM

## 2021-01-29 RX ORDER — PANTOPRAZOLE SODIUM 40 MG/1
40 TABLET, DELAYED RELEASE ORAL
Qty: 30 TAB | Refills: 0 | Status: SHIPPED | OUTPATIENT
Start: 2021-01-29 | End: 2021-07-01

## 2021-01-29 RX ORDER — FENTANYL CITRATE 50 UG/ML
INJECTION, SOLUTION INTRAMUSCULAR; INTRAVENOUS AS NEEDED
Status: DISCONTINUED | OUTPATIENT
Start: 2021-01-29 | End: 2021-01-29 | Stop reason: HOSPADM

## 2021-01-29 RX ORDER — OXYCODONE AND ACETAMINOPHEN 5; 325 MG/1; MG/1
1 TABLET ORAL
Qty: 12 TAB | Refills: 0 | Status: SHIPPED | OUTPATIENT
Start: 2021-01-29 | End: 2021-02-01

## 2021-01-29 RX ORDER — LIDOCAINE HYDROCHLORIDE 20 MG/ML
INJECTION, SOLUTION EPIDURAL; INFILTRATION; INTRACAUDAL; PERINEURAL AS NEEDED
Status: DISCONTINUED | OUTPATIENT
Start: 2021-01-29 | End: 2021-01-29 | Stop reason: HOSPADM

## 2021-01-29 RX ORDER — EPHEDRINE SULFATE/0.9% NACL/PF 50 MG/5 ML
SYRINGE (ML) INTRAVENOUS AS NEEDED
Status: DISCONTINUED | OUTPATIENT
Start: 2021-01-29 | End: 2021-01-29 | Stop reason: HOSPADM

## 2021-01-29 RX ADMIN — FENTANYL CITRATE 50 MCG: 50 INJECTION, SOLUTION INTRAMUSCULAR; INTRAVENOUS at 08:45

## 2021-01-29 RX ADMIN — ONDANSETRON 4 MG: 2 INJECTION INTRAMUSCULAR; INTRAVENOUS at 08:23

## 2021-01-29 RX ADMIN — SUCCINYLCHOLINE CHLORIDE 160 MG: 20 INJECTION, SOLUTION INTRAMUSCULAR; INTRAVENOUS at 08:23

## 2021-01-29 RX ADMIN — DEXAMETHASONE SODIUM PHOSPHATE 4 MG: 4 INJECTION, SOLUTION INTRAMUSCULAR; INTRAVENOUS at 08:23

## 2021-01-29 RX ADMIN — PROPOFOL 200 MG: 10 INJECTION, EMULSION INTRAVENOUS at 08:23

## 2021-01-29 RX ADMIN — FENTANYL CITRATE 50 MCG: 50 INJECTION, SOLUTION INTRAMUSCULAR; INTRAVENOUS at 08:23

## 2021-01-29 RX ADMIN — LIDOCAINE HYDROCHLORIDE 40 MG: 20 INJECTION, SOLUTION EPIDURAL; INFILTRATION; INTRACAUDAL; PERINEURAL at 08:23

## 2021-01-29 RX ADMIN — Medication 10 MG: at 09:15

## 2021-01-29 RX ADMIN — MIDAZOLAM HYDROCHLORIDE 2 MG: 2 INJECTION, SOLUTION INTRAMUSCULAR; INTRAVENOUS at 08:16

## 2021-01-29 RX ADMIN — Medication 10 MG: at 08:31

## 2021-01-29 NOTE — ANESTHESIA POSTPROCEDURE EVALUATION
Procedure(s):  ABLATION A-FLUTTER/SHAILESH PRIOR  Lt Atrial Pace & Record During Ep Study. general    Anesthesia Post Evaluation      Multimodal analgesia: multimodal analgesia used between 6 hours prior to anesthesia start to PACU discharge  Patient location during evaluation: PACU  Patient participation: complete - patient participated  Level of consciousness: awake  Pain score: 3  Airway patency: patent  Anesthetic complications: no  Cardiovascular status: acceptable  Respiratory status: acceptable  Hydration status: acceptable  Post anesthesia nausea and vomiting:  none  Final Post Anesthesia Temperature Assessment:  Normothermia (36.0-37.5 degrees C)      INITIAL Post-op Vital signs:   Vitals Value Taken Time   /71 01/29/21 1021   Temp 37 °C (98.6 °F) 01/29/21 0954   Pulse 118 01/29/21 1032   Resp 9 01/29/21 1032   SpO2 98 % 01/29/21 1032   Vitals shown include unvalidated device data.

## 2021-01-29 NOTE — PROGRESS NOTES
6501:    TRANSFER - IN REPORT:    Verbal report received from Ania Shelby (name) on Kristin Dill  being received from EP Lab (unit) for routine post - op      Report consisted of patients Situation, Background, Assessment and   Recommendations(SBAR). Information from the following report(s) SBAR, Procedure Summary, Intake/Output, MAR and Recent Results was reviewed with the receiving nurse. Opportunity for questions and clarification was provided. Assessment completed upon patients arrival to unit and care assumed.

## 2021-01-29 NOTE — Clinical Note
TRANSFER - OUT REPORT:     [facility]. Report consisted of patient's Situation, Background, Assessment and   Recommendations(SBAR). Opportunity for questions and clarification was provided.

## 2021-01-29 NOTE — TELEPHONE ENCOUNTER
Patient is requesting a new prescription be sent to [facility] following his procedure with Dr. Trent Morgan today.

## 2021-01-29 NOTE — PROGRESS NOTES
I called and tried to discussed with father at [phone] and mother at [phone], no answer at either twice, will try again later. If he has more episodes, can also increase lopressor and flecainide.

## 2021-01-29 NOTE — ROUTINE PROCESS
0715: Cath holding summary     Patient escorted to cath holding from waiting area ambulatory, alert and oriented x 4, voicing no complaints. Changed into gown and placed on monitor. NPO since MN. Lab results, med rec and H&P reviewed on chart. PIV x 2 inserted without difficulty. 0800:  TRANSFER - OUT REPORT:    Verbal report given to [facility] (name) on [place]  being transferred to EP Lab(unit) for ordered procedure       Report consisted of patients Situation, Background, Assessment and   Recommendations(SBAR). Information from the following report(s) SBAR, Kardex, Intake/Output, MAR, Recent Results and Pre Procedure Checklist was reviewed with the receiving nurse. Lines:       Opportunity for questions and clarification was provided. Patient transported with:   Tech, Anesthesia    1113: AVS Discharge instructions reviewed with patient and copy given to patient. All questions answered. Patient verbalized understanding to all discharge instructions. PIV removed. Procedural site within normal limits. No hematoma or bleeding noted from procedural and PIV site. No pain noted at discharge. Patient discharged with support person in stable condition. Escorted out to vehicle for transport home.

## 2021-01-29 NOTE — DISCHARGE INSTRUCTIONS
Disposition:  When discharged to home will need follow-up in my office 4 weeks. Please call my office at [phone] if any questions or concerns. Restrictions:  No driving for at least 24 hours after surgery. No heavy lifting > 10 lbs or prolonged standing, walking, or running x 1 week. OK to shower today over incision and remove dressing. Monitor groin for any signs of bleeding and please contact office at [phone] if any issues. There may be some mild bruising which is not unusual.  If any new symptoms develop, such as chest pain, dyspnea, dizziness, please contact office at [phone] immediately. ABLATION DISCHARGE INSTRUCTIONS    It is normal to feel tired the first couple days. Take it easy and follow the physicians instructions. CHECK THE CATHETER INSERTION SITE DAILY:  You may shower 24 hours after the procedure, remove the bandage during showering. Wash with soap and water and pat dry. Gentle cleaning of the site with soap and water is sufficient, cover with a dry clean dressing or bandage. Do not apply creams or powders to the area. Do not sit in a bathtub or pool of water for 7 days or until wound has completely healed. Temporary bruising and discomfort is normal and may last a few weeks. You may have a  formation of a small lump at the site which may last up to 6 weeks. CALL THE PHYSICIAN:  If the site becomes red, swollen or feels warm to the touch  If there is bleeding or drainage or if there is unusual pain at the groin or down the leg. If there is any bleeding, lie down, apply pressure or have someone apply pressure with a clean cloth until the bleeding stops. If the bleeding continues, call 911 to be transported to the hospital.  DO NOT DRIVE YOURSELF, [place] [id number]. Activity:      For the first 24-48 hours or as instructed by the physician:  No lifting, pushing or pulling over 10 pounds and no straining the insertion site.  Do not life grocery bags or the garbage can, do not run the vacuum  or  for 7 days. Start with short walks as in the hospital and gradually increase as tolerated each day. It is recommended to walk 30 minutes 5-7 days per week. Follow your physicians instructions on activity. Avoid walking outside in extremes of heat or cold. Walk inside when it is cold and windy or hot and humid. Things to keep in mind:  No driving for at least 24 hours, or as designated by your physician. Limit the number of times you go up and down the stairs  Take rests and pace yourself with activity. Be careful and do not strain with bowel movements. Medications: Take all medications as prescribed  Call your physician if you have any questions  Keep an updated list of your medications with you at all times and give a list to your physician and pharmacist    Signs and Symptoms:  Be cautious of symptoms of angina or recurrent symptoms such as chest discomfort, unusual shortness of breath or fatigue, palpitations. After Care: Follow up with your physician as instructed. Follow a heart healthy diet with proper portion control, daily stress management, daily exercise, blood pressure and cholesterol control , and smoking cessation.

## 2021-01-29 NOTE — Clinical Note
TRANSFER - IN REPORT:     Verbal report received from: PIERO Reid. Report consisted of patient's Situation, Background, Assessment and   Recommendations(SBAR). Opportunity for questions and clarification was provided. Assessment completed upon patient's arrival to unit and care assumed. Patient transported with a Registered Nurse.

## 2021-02-10 ENCOUNTER — OFFICE VISIT (OUTPATIENT)
Dept: CARDIOLOGY CLINIC | Age: 51
End: 2021-02-10
Payer: MEDICAID

## 2021-02-10 VITALS
DIASTOLIC BLOOD PRESSURE: 92 MMHG | WEIGHT: 251 LBS | HEART RATE: 60 BPM | HEIGHT: 69 IN | SYSTOLIC BLOOD PRESSURE: 136 MMHG | OXYGEN SATURATION: 98 % | BODY MASS INDEX: 37.18 KG/M2

## 2021-02-10 DIAGNOSIS — Z95.9 CARDIAC DEVICE IN SITU: ICD-10-CM

## 2021-02-10 DIAGNOSIS — Z86.79 S/P ABLATION OF ATRIAL FLUTTER: Primary | ICD-10-CM

## 2021-02-10 DIAGNOSIS — I48.0 PAROXYSMAL ATRIAL FIBRILLATION (HCC): ICD-10-CM

## 2021-02-10 DIAGNOSIS — Z98.890 S/P ABLATION OF ATRIAL FLUTTER: Primary | ICD-10-CM

## 2021-02-10 DIAGNOSIS — Z98.890 S/P ABLATION OF ATRIAL FIBRILLATION: ICD-10-CM

## 2021-02-10 DIAGNOSIS — Z86.79 S/P ABLATION OF ATRIAL FIBRILLATION: ICD-10-CM

## 2021-02-10 PROCEDURE — 99214 OFFICE O/P EST MOD 30 MIN: CPT | Performed by: INTERNAL MEDICINE

## 2021-02-10 NOTE — PROGRESS NOTES
Charleenantonio Rascon presents today for   Chief Complaint   Patient presents with    Follow-up       Charleen Rascon preferred language for health care discussion is english/other. Is someone accompanying this pt? no    Is the patient using any DME equipment during [address]? no    Depression Screening:  3 most recent PHQ Screens 2/10/2021   Little interest or pleasure in doing things Not at all   Feeling down, depressed, irritable, or hopeless Not at all   Total Score PHQ 2 0       Learning Assessment:  Learning Assessment 2/10/2021   PRIMARY LEARNER Patient   PRIMARY LANGUAGE ENGLISH   LEARNER PREFERENCE PRIMARY DEMONSTRATION   ANSWERED BY patient   RELATIONSHIP SELF       Abuse Screening:  Abuse Screening Questionnaire 2/10/2021   Do you ever feel afraid of your partner? N   Are you in a relationship with someone who physically or mentally threatens you? N   Is it safe for you to go home? Y       Fall Risk  Fall Risk Assessment, last 12 mths 2/10/2021   Able to walk? Yes   Fall in past 12 months? 0   Do you feel unsteady? 0   Are you worried about falling 0       Pt currently taking Anticoagulant therapy? Xarelto 20 mg once a day    Coordination of Care:  1. Have you been to the ER, urgent care clinic since your last visit? Hospitalized since your last visit? yes    2. Have you seen or consulted any other health care providers outside of the [address] [place] [address] since your last visit? Include any pap smears or colon screening.  no

## 2021-02-10 NOTE — PROGRESS NOTES
History of Present Illness:  48year old male here for follow up. He underwent right sided atrial flutter ablation 01/29/21. He had some perioperative left sided residual flutter, degenerated into a-fib. Ultimately it resolved about 3:00-4:00 in the afternoon. He has been doing well since then. He has not had any palpitations, no new chest pain, dyspnea, PND, orthopnea, edema. No bleeding issues. He did require nasal airway due to obstruction. He has had a sleep study in the past, but no obvious sleep apnea. Impression:  1. Paroxysmal symptomatic a-fib with failure of calcium channel blockers, beta blockers. 2. Status post pulmonary vein isolation with cryo 12/01/20. 3. Status post right sided atrial flutter ablation 01/29/21.  4. History of sleep study last year, heart rate down to 30s, but no obvious sleep apnea. 5. Subcutaneous loop recorder placed October, 2020, no events since his last ablation. 6. History of pilonidal cyst surgery remotely. 7. History of remote pancreatitis and cholecystitis 2020.  8. Family history of father with pacemaker, which I placed, as well as atrial fibrillation. Plan:  He has not had any recurrent atrial fibrillation or flutter, but he is still only about two months out from his initial cryoablation for his atrial fibrillation. I am going to see him back in three months. We will continue with Xarelto, Flecainide, beta blocker for now. If he has breakthrough, I would consider him for an epicardial ablation given his young age. All questions answered. He is requesting cardiac rehab, would like at [facility], will place referral to there, will need to be faxed. Past Medical History:   Diagnosis Date    Afib [facility])        Current Outpatient Medications   Medication Sig Dispense Refill    pantoprazole (PROTONIX) 40 mg tablet Take 1 Tab by mouth Daily (before breakfast). 30 Tab 0    flecainide (TAMBOCOR) 50 mg tablet Take 1 Tab by mouth every twelve (12) hours.  61 Tab 3    rivaroxaban (XARELTO) 20 mg tab tablet Take 1 Tab by mouth daily. Indications: treatment to prevent blood clots in chronic atrial fibrillation 30 Tab 3    metoprolol succinate (TOPROL-XL) 25 mg XL tablet Take 12.5 mg by mouth daily. Social History   reports that he has never smoked. He has never used smokeless tobacco.   reports previous alcohol use. Family History  family history includes Hypertension in his father; Prostate Cancer in his father. Review of Systems  Except as stated above include:  Constitutional: Negative for fever, chills and malaise/fatigue. HEENT: No congestion or recent URI. Gastrointestinal: No nausea, vomiting, abdominal pain, bloody stools. Pulmonary:  Negative except as stated above. Cardiac:  Negative except as stated above. Musculoskeletal: Negative except as stated above. Neurological:  No localized symptoms. Skin:  Negative except as stated above. Psych:  Negative except as stated above. Endocrine:  Negative except as stated above. PHYSICAL EXAM  BP Readings from Last 3 Encounters:   02/10/21 (!) 136/92   01/29/21 132/84   01/29/21 115/89     Pulse Readings from Last 3 Encounters:   02/10/21 60   01/29/21 (!) 116   01/06/21 73     Wt Readings from Last 3 Encounters:   02/10/21 113.9 kg (251 lb)   01/29/21 113.4 kg (250 lb)   01/29/21 114.8 kg (253 lb)     General:   Well developed, well groomed. Head/Neck:   No obvious jugular venous distention     No obvious carotid pulsations. No evidence of xanthelasma. Lungs:   No respiratory distress. Clear bilaterally. Heart:  Regular rate and rhythm. Normal S1/S2. Palpation grossly normal.    No significant murmurs, rubs or gallops. Abdomen:   Non-acute abdomen. No obvious pulsations. Extremities:   Intact peripheral pulses. No significant edema. Neurological:   Alert and oriented to person, place, time. No focal neurological deficit visually.   Skin:   No obvious rash    Blood Pressure Metric:  Monitor recommended and adjustments stated if needed.

## 2021-02-15 NOTE — PROGRESS NOTES
I have personally seen and evaluated the device findings. Interrogation reviewed and I agree with assessment.     [address]  Reviewed by me in office

## 2021-04-01 ENCOUNTER — OFFICE VISIT (OUTPATIENT)
Dept: CARDIOLOGY CLINIC | Age: 51
End: 2021-04-01
Payer: MEDICAID

## 2021-04-01 VITALS
SYSTOLIC BLOOD PRESSURE: 110 MMHG | OXYGEN SATURATION: 99 % | HEIGHT: 69 IN | WEIGHT: 251 LBS | DIASTOLIC BLOOD PRESSURE: 80 MMHG | BODY MASS INDEX: 37.18 KG/M2 | HEART RATE: 63 BPM

## 2021-04-01 DIAGNOSIS — Z98.890 S/P ABLATION OF ATRIAL FLUTTER: Primary | ICD-10-CM

## 2021-04-01 DIAGNOSIS — Z86.79 S/P ABLATION OF ATRIAL FLUTTER: Primary | ICD-10-CM

## 2021-04-01 DIAGNOSIS — Z95.9 CARDIAC DEVICE IN SITU: ICD-10-CM

## 2021-04-01 DIAGNOSIS — Z86.79 S/P ABLATION OF ATRIAL FIBRILLATION: ICD-10-CM

## 2021-04-01 DIAGNOSIS — Z98.890 S/P ABLATION OF ATRIAL FIBRILLATION: ICD-10-CM

## 2021-04-01 PROCEDURE — 99215 OFFICE O/P EST HI 40 MIN: CPT | Performed by: INTERNAL MEDICINE

## 2021-04-01 RX ORDER — LANOLIN ALCOHOL/MO/W.PET/CERES
400 CREAM (GRAM) TOPICAL DAILY
Qty: 30 TAB | Refills: 2 | Status: SHIPPED | OUTPATIENT
Start: 2021-04-01 | End: 2021-09-01

## 2021-04-01 NOTE — PROGRESS NOTES
Gely Michelle presents today for   Chief Complaint   Patient presents with    Follow-up     per patient request for issues with ablation       Gely Michelle preferred language for health care discussion is english/other. Is someone accompanying this pt? no    Is the patient using any DME equipment during [address]? no    Depression Screening:  3 most recent PHQ Screens 4/1/2021   Little interest or pleasure in doing things Not at all   Feeling down, depressed, irritable, or hopeless Not at all   Total Score PHQ 2 0       Learning Assessment:  Learning Assessment 4/1/2021   PRIMARY LEARNER Patient   PRIMARY LANGUAGE ENGLISH   LEARNER PREFERENCE PRIMARY DEMONSTRATION   ANSWERED BY patient   RELATIONSHIP SELF       Abuse Screening:  Abuse Screening Questionnaire 4/1/2021   Do you ever feel afraid of your partner? N   Are you in a relationship with someone who physically or mentally threatens you? N   Is it safe for you to go home? Y       Fall Risk  Fall Risk Assessment, last 12 mths 2/10/2021   Able to walk? Yes   Fall in past 12 months? 0   Do you feel unsteady? 0   Are you worried about falling 0       Pt currently taking Anticoagulant therapy? no  Coordination of Care:  1. Have you been to the ER, urgent care clinic since your last visit? Hospitalized since your last visit? no    2. Have you seen or consulted any other health care providers outside of the [address] [place] [address] since your last visit? Include any pap smears or colon screening.  no

## 2021-04-01 NOTE — PROGRESS NOTES
History of Present Illness:  46year old male here for follow up. He has a history of atrial flutter, as well as atrial fibrillation ablation. He had been doing quite well since his last ablation, however he did have an episode in February. At this time he is having some concerns about visual changes with Flecainide, as well as fatigue with beta blocker. The dose has been decreased. Denies any chest pain, syncope, PND, orthopnea or edema. Impression:  1. Paroxysmal symptomatic a-fib with failure of calcium channel blockers and beta blockers. Currently on Flecainide. Symptoms of visual changes as side effect, wanting to stop. 2. Fatigue with beta blocker. 3. S/P pulmonary vein isolation with cryoballoon 12/01/20.  4. S/P right sided atrial flutter ablation 01/29/21.    5. History of sleep study last year with heart rate down to 30s, but no obvious ischemia. Subcutaneous loop recorder placed October, 2020, with one short event in mid February. 6. History of pilonidal cyst surgery remotely. 7. Remote pancreatitis and cholecystitis 2020.  8. Strong family history of arrhythmias with father with a pacemaker, as well as atrial fibrillation. Plan: At this point he would like to get off the medications due to side effects. We will go ahead and stop Flecainide and Metoprolol. I stated he could use the Flecainide, take 2-3 tabs if he needs to for pill in pocket technique. If he has more episodes, I will refer him to Dr. Cherylene Sou for evaluation for epicardial ablation given his young age. He is also worried about low magnesium level and he had been on prescription in the past and I will go ahead and give 400 mg daily. All questions answered and I will see back in the next 2-3 months. Past Medical History:   Diagnosis Date    Afib [facility])        Current Outpatient Medications   Medication Sig Dispense Refill    pantoprazole (PROTONIX) 40 mg tablet Take 1 Tab by mouth Daily (before breakfast).  30 Tab 0  flecainide (TAMBOCOR) 50 mg tablet Take 1 Tab by mouth every twelve (12) hours. 60 Tab 3    rivaroxaban (XARELTO) 20 mg tab tablet Take 1 Tab by mouth daily. Indications: treatment to prevent blood clots in chronic atrial fibrillation 30 Tab 3    metoprolol succinate (TOPROL-XL) 25 mg XL tablet Take 12.5 mg by mouth daily. Social History   reports that he has never smoked. He has never used smokeless tobacco.   reports previous alcohol use. Family History  family history includes Hypertension in his father; Prostate Cancer in his father. Review of Systems  Except as stated above include:  Constitutional: Negative for fever, chills and malaise/fatigue. HEENT: No congestion or recent URI. Gastrointestinal: No nausea, vomiting, abdominal pain, bloody stools. Pulmonary:  Negative except as stated above. Cardiac:  Negative except as stated above. Musculoskeletal: Negative except as stated above. Neurological:  No localized symptoms. Skin:  Negative except as stated above. Psych:  Negative except as stated above. Endocrine:  Negative except as stated above. PHYSICAL EXAM  BP Readings from Last 3 Encounters:   04/01/21 110/80   02/10/21 (!) 136/92   01/29/21 132/84     Pulse Readings from Last 3 Encounters:   04/01/21 63   02/10/21 60   01/29/21 (!) 116     Wt Readings from Last 3 Encounters:   04/01/21 113.9 kg (251 lb)   02/10/21 113.9 kg (251 lb)   01/29/21 113.4 kg (250 lb)     General:   Well developed, well groomed. Head/Neck:   No obvious jugular venous distention     No obvious carotid pulsations. No evidence of xanthelasma. Lungs:   No respiratory distress. Clear bilaterally. Heart:  Regular rate and rhythm. Normal S1/S2. Palpation grossly normal.    No significant murmurs, rubs or gallops. Abdomen:   Non-acute abdomen. No obvious pulsations. Extremities:   Intact peripheral pulses. No significant edema.     Neurological:   Alert and oriented to person, place, time. No focal neurological deficit visually. Skin:   No obvious rash    Blood Pressure Metric:  Monitor recommended and adjustments stated if needed.

## 2021-04-01 NOTE — PATIENT INSTRUCTIONS
Follow up with Dr. Veronique Lane in 2 months or sooner if needed  Start taking Magnesium Oxide 400 mg one tablet by mouth daily  Stop taking Metoprolol Succinate  Stop taking Flecainide

## 2021-04-02 NOTE — PROGRESS NOTES
I have personally seen and evaluated the device findings. Interrogation reviewed and I agree with assessment.     Estefani Gomez

## 2021-06-18 RX ORDER — DILTIAZEM HYDROCHLORIDE 120 MG/1
120 CAPSULE, COATED, EXTENDED RELEASE ORAL DAILY
Qty: 30 CAPSULE | Refills: 5 | Status: CANCELLED | OUTPATIENT
Start: 2021-06-18

## 2021-06-18 RX ORDER — DILTIAZEM HYDROCHLORIDE 120 MG/1
120 CAPSULE, COATED, EXTENDED RELEASE ORAL DAILY
Qty: 30 CAPSULE | Refills: 5 | Status: SHIPPED | OUTPATIENT
Start: 2021-06-18 | End: 2021-12-06

## 2021-07-01 ENCOUNTER — CLINICAL SUPPORT (OUTPATIENT)
Dept: CARDIOLOGY CLINIC | Age: 51
End: 2021-07-01

## 2021-07-01 ENCOUNTER — OFFICE VISIT (OUTPATIENT)
Dept: CARDIOLOGY CLINIC | Age: 51
End: 2021-07-01
Payer: MEDICAID

## 2021-07-01 VITALS
BODY MASS INDEX: 34.66 KG/M2 | SYSTOLIC BLOOD PRESSURE: 120 MMHG | HEART RATE: 81 BPM | HEIGHT: 69 IN | DIASTOLIC BLOOD PRESSURE: 76 MMHG | WEIGHT: 234 LBS | OXYGEN SATURATION: 97 %

## 2021-07-01 DIAGNOSIS — Z95.818 STATUS POST PLACEMENT OF IMPLANTABLE LOOP RECORDER: ICD-10-CM

## 2021-07-01 DIAGNOSIS — Z98.890 S/P ABLATION OF ATRIAL FLUTTER: Primary | ICD-10-CM

## 2021-07-01 DIAGNOSIS — Z95.9 CARDIAC DEVICE IN SITU: Primary | ICD-10-CM

## 2021-07-01 DIAGNOSIS — Z79.01 ON RIVAROXABAN THERAPY: ICD-10-CM

## 2021-07-01 DIAGNOSIS — Z86.79 S/P ABLATION OF ATRIAL FLUTTER: Primary | ICD-10-CM

## 2021-07-01 DIAGNOSIS — I48.0 PAROXYSMAL ATRIAL FIBRILLATION (HCC): ICD-10-CM

## 2021-07-01 DIAGNOSIS — I48.92 ATRIAL FLUTTER, UNSPECIFIED TYPE (HCC): ICD-10-CM

## 2021-07-01 PROCEDURE — 99214 OFFICE O/P EST MOD 30 MIN: CPT | Performed by: INTERNAL MEDICINE

## 2021-07-01 PROCEDURE — 93291 INTERROG DEV EVAL SCRMS IP: CPT | Performed by: INTERNAL MEDICINE

## 2021-07-01 RX ORDER — PANTOPRAZOLE SODIUM 40 MG/1
40 TABLET, DELAYED RELEASE ORAL DAILY
Qty: 30 TABLET | Refills: 6 | Status: SHIPPED | OUTPATIENT
Start: 2021-07-01 | End: 2021-12-15

## 2021-07-01 NOTE — PROGRESS NOTES
History of Present Illness:  46year old male here for follow up. He has a history of atrial fibrillation, as well as flutter, ablations for both. He has been doing well intermittently. When I saw him in April the plan was to stop his Flecainide and Metoprolol due to side effects. He has had some episodes since then, the last one earlier this month. I switched him to Cardizem as he did not do well with the beta blocker with some fatigue. He notes that many of his symptoms seem to be related to indigestion and GERD-like symptoms. When he previously took the Protonix after the ablation, he thought actually this helped. He is concerned about perhaps an underlying hernia. When he had his chest CT last November, there was no mention of a hernia. Impression:  1. Paroxysmal symptomatic atrial fibrillation with intolerance to beta blockers and preference not to be on long term antiarrhythmics, taken off Flecainide last April due to concern for visual side effects. 2. PVI with cryo 12/01/20.  3. Right sided atrial flutter ablation 01/29/21.  4. History of sleep study. Heart rate down to 30s last year. 5. Subcutaneous loop recorder October, 2020.  6. History of remote pilonidal cyst surgery. 7. Remote pancreatitis and cholecystitis in 2020.  8. Strong family history of arrhythmias, father with pacemaker, as well as a-fib. 9. GERD. Plan: At this point he did have some atrial fibrillation, last episode earlier this month. He was placed on Cardizem, seems to be doing well. The plan will be to start Protonix 40 mg a day and see him back in two months. If he is still having episodes, refer for epicardial ablation, along with left atrial appendage ligation. If he continues to have reflux symptoms, it may be reasonable to consider GI evaluation as well.         Past Medical History:   Diagnosis Date    Afib [facility])        Current Outpatient Medications   Medication Sig Dispense Refill    dilTIAZem ER (CARDIZEM CD) 120 mg capsule Take 1 Capsule by mouth daily. 30 Capsule 5    rivaroxaban (XARELTO) 20 mg tab tablet Take 1 Tab by mouth daily. Indications: treatment to prevent blood clots in chronic atrial fibrillation 30 Tab 3    magnesium oxide (MAG-OX) 400 mg tablet Take 1 Tab by mouth daily. (Patient not taking: Reported on 7/1/2021) 30 Tab 2       Social History   reports that he has never smoked. He has never used smokeless tobacco.   reports previous alcohol use. Family History  family history includes Hypertension in his father; Prostate Cancer in his father. Review of Systems  Except as stated above include:  Constitutional: Negative for fever, chills and malaise/fatigue. HEENT: No congestion or recent URI. Gastrointestinal: No nausea, vomiting, abdominal pain, bloody stools. Pulmonary:  Negative except as stated above. Cardiac:  Negative except as stated above. Musculoskeletal: Negative except as stated above. Neurological:  No localized symptoms. Skin:  Negative except as stated above. Psych:  Negative except as stated above. Endocrine:  Negative except as stated above. PHYSICAL EXAM  BP Readings from Last 3 Encounters:   07/01/21 120/76   04/01/21 110/80   02/10/21 (!) 136/92     Pulse Readings from Last 3 Encounters:   07/01/21 81   04/01/21 63   02/10/21 60     Wt Readings from Last 3 Encounters:   07/01/21 106.1 kg (234 lb)   04/01/21 113.9 kg (251 lb)   02/10/21 113.9 kg (251 lb)     General:   Well developed, well groomed. Head/Neck:   No obvious jugular venous distention     No obvious carotid pulsations. No evidence of xanthelasma. Lungs:   No respiratory distress. Clear bilaterally. Heart:  Regular rate and rhythm. Normal S1/S2. Palpation grossly normal.    No significant murmurs, rubs or gallops. Abdomen:   Non-acute abdomen. No obvious pulsations. Extremities:   Intact peripheral pulses. No significant edema.     Neurological:   Alert and oriented to person, place, time. No focal neurological deficit visually. Skin:   No obvious rash    Blood Pressure Metric:  Monitor recommended and adjustments stated if needed.

## 2021-07-07 NOTE — PROGRESS NOTES
Implantable Loop recorder , 0 Symptoms. 0 Tachy events. 0 Braxton events. 0 pauses. 34 AT/AF events. Patient has a hx PAF of and is taking xarelto.

## 2021-07-12 NOTE — PROGRESS NOTES
I have personally seen and evaluated the device findings. Interrogation reviewed and I agree with assessment.     Uriel Vuong

## 2021-07-13 ENCOUNTER — DOCUMENTATION ONLY (OUTPATIENT)
Dept: CARDIOLOGY CLINIC | Age: 51
End: 2021-07-13

## 2021-07-13 DIAGNOSIS — E66.01 SEVERE OBESITY (HCC): Primary | ICD-10-CM

## 2021-07-13 NOTE — PROGRESS NOTES
Verbal order and read back per Darek Freedman MD  Referral to GI    Requisition sent over along with office note, insurance card, face sheet. Fax complete confirmed.

## 2021-09-01 ENCOUNTER — OFFICE VISIT (OUTPATIENT)
Dept: CARDIOLOGY CLINIC | Age: 51
End: 2021-09-01
Payer: MEDICAID

## 2021-09-01 ENCOUNTER — CLINICAL SUPPORT (OUTPATIENT)
Dept: CARDIOLOGY CLINIC | Age: 51
End: 2021-09-01

## 2021-09-01 VITALS
HEIGHT: 69 IN | HEART RATE: 69 BPM | OXYGEN SATURATION: 98 % | BODY MASS INDEX: 34.36 KG/M2 | SYSTOLIC BLOOD PRESSURE: 150 MMHG | WEIGHT: 232 LBS | DIASTOLIC BLOOD PRESSURE: 76 MMHG

## 2021-09-01 DIAGNOSIS — Z86.79 S/P ABLATION OF ATRIAL FIBRILLATION: ICD-10-CM

## 2021-09-01 DIAGNOSIS — Z79.01 ON RIVAROXABAN THERAPY: ICD-10-CM

## 2021-09-01 DIAGNOSIS — I49.5 SICK SINUS SYNDROME (HCC): ICD-10-CM

## 2021-09-01 DIAGNOSIS — Z95.9 CARDIAC DEVICE IN SITU: Primary | ICD-10-CM

## 2021-09-01 DIAGNOSIS — I48.0 PAROXYSMAL ATRIAL FIBRILLATION (HCC): Primary | ICD-10-CM

## 2021-09-01 DIAGNOSIS — K21.9 GASTROESOPHAGEAL REFLUX DISEASE, UNSPECIFIED WHETHER ESOPHAGITIS PRESENT: ICD-10-CM

## 2021-09-01 DIAGNOSIS — Z86.79 S/P ABLATION OF ATRIAL FLUTTER: ICD-10-CM

## 2021-09-01 DIAGNOSIS — Z95.9 CARDIAC DEVICE IN SITU: ICD-10-CM

## 2021-09-01 DIAGNOSIS — G47.33 OSA (OBSTRUCTIVE SLEEP APNEA): ICD-10-CM

## 2021-09-01 DIAGNOSIS — Z98.890 S/P ABLATION OF ATRIAL FIBRILLATION: ICD-10-CM

## 2021-09-01 DIAGNOSIS — Z98.890 S/P ABLATION OF ATRIAL FLUTTER: ICD-10-CM

## 2021-09-01 PROCEDURE — 99214 OFFICE O/P EST MOD 30 MIN: CPT | Performed by: INTERNAL MEDICINE

## 2021-09-01 PROCEDURE — 93291 INTERROG DEV EVAL SCRMS IP: CPT | Performed by: INTERNAL MEDICINE

## 2021-09-01 NOTE — PROGRESS NOTES
Aleja Cash presents today for   Chief Complaint   Patient presents with    Follow-up     2 month follow up - no cardiac complaints        Aleja Cash preferred language for health care discussion is english/other. Is someone accompanying this pt? no    Is the patient using any DME equipment during [address]? no    Depression Screening:  3 most recent PHQ Screens 9/1/2021   Little interest or pleasure in doing things Not at all   Feeling down, depressed, irritable, or hopeless Not at all   Total Score PHQ 2 0       Learning Assessment:  Learning Assessment 4/1/2021   PRIMARY LEARNER Patient   PRIMARY LANGUAGE ENGLISH   LEARNER PREFERENCE PRIMARY DEMONSTRATION   ANSWERED BY patient   RELATIONSHIP SELF       Abuse Screening:  Abuse Screening Questionnaire 7/1/2021   Do you ever feel afraid of your partner? N   Are you in a relationship with someone who physically or mentally threatens you? N   Is it safe for you to go home? Y       Fall Risk  Fall Risk Assessment, last 12 mths 2/10/2021   Able to walk? Yes   Fall in past 12 months? 0   Do you feel unsteady? 0   Are you worried about falling 0       Pt currently taking Anticoagulant therapy? Xarelto     Coordination of Care:  1. Have you been to the ER, urgent care clinic since your last visit? Hospitalized since your last visit? no    2. Have you seen or consulted any other health care providers outside of the [address] [place] [address] since your last visit? Include any pap smears or colon screening.  no

## 2021-09-01 NOTE — PROGRESS NOTES
History of Present Illness:  46year old male here for follow up. He has a history of atrial fibrillation and flutter, ablations for both. He has a subcutaneous loop recorder in place. He was intolerant to Metoprolol due to fatigue. He had previously been treated with Flecainide, now is on Cardizem and Xarelto. His event monitor showed at least four episodes of bradycardia with pauses between 3-4 seconds. They were at 10 AM and 2 AM, possibly while asleep. He continues to have intermittent palpitations, which appear to be PACs. He has occasional atrial fibrillation as well. It seems to come and go, but the rates are well controlled. He has been following up with [facility]. Most of his episodes seem to be related to indigestion and GERD and he is planning to get endoscopy and colonoscopy within the coming weeks. He also recently was diagnosed with sleep apnea with repeat sleep study and is planning to start CPAP. Impression:  1. Paroxysmal symptomatic atrial fibrillation with intolerance to beta blockers and preference not to be on long term anticoagulation, taken off Flecainide April 2021 due to concern for visual side effects. 2. PVI with cryo 12/01/20.  3. Right sided atrial flutter ablation 01/29/21.  4. History of sleep study with mild sleep apnea, awaiting CPAP machine. 5. History of GERD, awaiting EGD and colonoscopy, seems to be partially his trigger for events. 6. Subcutaneous loop recorder October 2020. 7. Remote pancreatitis and cholecystitis. 6. Strong family history of arrhythmias with father with a pacemaker, as well as a-fib. Plan:  I discussed his pauses, which at least half appear to be nocturnal.  He is getting endoscopy in the coming weeks and is also planning to get a CPAP machine. I talked about the inevitability of likely pacemaker, but at this point he has not had any syncope.   I would like to see back in a couple months and we can make a final decision how to proceed. All questions answered. Past Medical History:   Diagnosis Date    Afib ([facility] [address] 06/2020    After Tick bite     Hiatal hernia     Sleep apnea     Dx 8/27/2021-waiting on cpap       Current Outpatient Medications   Medication Sig Dispense Refill    pantoprazole (PROTONIX) 40 mg tablet Take 1 Tablet by mouth daily. 30 Tablet 6    dilTIAZem ER (CARDIZEM CD) 120 mg capsule Take 1 Capsule by mouth daily. 30 Capsule 5    rivaroxaban (XARELTO) 20 mg tab tablet Take 1 Tab by mouth daily. Indications: treatment to prevent blood clots in chronic atrial fibrillation 30 Tab 3       Social History   reports that he has never smoked. He has never used smokeless tobacco.   reports previous alcohol use. Family History  family history includes Hypertension in his father; Prostate Cancer in his father. Review of Systems  Except as stated above include:  Constitutional: Negative for fever, chills and malaise/fatigue. HEENT: No congestion or recent URI. Gastrointestinal: No nausea, vomiting, abdominal pain, bloody stools. Pulmonary:  Negative except as stated above. Cardiac:  Negative except as stated above. Musculoskeletal: Negative except as stated above. Neurological:  No localized symptoms. Skin:  Negative except as stated above. Psych:  Negative except as stated above. Endocrine:  Negative except as stated above. PHYSICAL EXAM  BP Readings from Last 3 Encounters:   09/01/21 (!) 150/76   07/01/21 120/76   04/01/21 110/80     Pulse Readings from Last 3 Encounters:   09/01/21 69   07/01/21 81   04/01/21 63     Wt Readings from Last 3 Encounters:   09/01/21 105.2 kg (232 lb)   07/01/21 106.1 kg (234 lb)   04/01/21 113.9 kg (251 lb)     General:   Well developed, well groomed. Head/Neck:   No obvious jugular venous distention     No obvious carotid pulsations. No evidence of xanthelasma. Lungs:   No respiratory distress. Clear bilaterally. Heart:  Regular rate and rhythm.   Normal S1/S2. Palpation grossly normal.    No significant murmurs, rubs or gallops. Abdomen:   Non-acute abdomen. No obvious pulsations. Extremities:   Intact peripheral pulses. No significant edema. Neurological:   Alert and oriented to person, place, time. No focal neurological deficit visually. Skin:   No obvious rash    Blood Pressure Metric:  Monitor recommended and adjustments stated if needed.

## 2021-09-03 ENCOUNTER — HOSPITAL ENCOUNTER (OUTPATIENT)
Dept: PREADMISSION TESTING | Age: 51
Discharge: HOME | End: 2021-09-03
Payer: MEDICAID

## 2021-09-03 PROCEDURE — U0003 INFECTIOUS AGENT DETECTION BY NUCLEIC ACID (DNA OR RNA); SEVERE ACUTE RESPIRATORY SYNDROME CORONAVIRUS 2 (SARS-COV-2) (CORONAVIRUS DISEASE [COVID-19]), AMPLIFIED PROBE TECHNIQUE, MAKING USE OF HIGH THROUGHPUT TECHNOLOGIES AS DESCRIBED BY CMS-2020-01-R: HCPCS

## 2021-09-04 LAB — SARS-COV-2, COV2NT: NOT DETECTED

## 2021-09-08 NOTE — PROGRESS NOTES
Reviewed, episode from last month with A.fib, most recent symptom episode with pacs. I have personally seen and evaluated the device findings. Interrogation reviewed and I agree with assessment.     Alysia Chew

## 2021-09-09 NOTE — PROGRESS NOTES
I have personally seen and evaluated the device findings. Interrogation reviewed and I agree with assessment.     Jewels Correa

## 2021-09-16 NOTE — PROGRESS NOTES
I have personally seen and evaluated the device findings. Interrogation reviewed and I agree with assessment.     Orlando Mendes

## 2021-09-30 ENCOUNTER — TELEPHONE (OUTPATIENT)
Dept: CARDIOLOGY CLINIC | Age: 51
End: 2021-09-30

## 2021-09-30 NOTE — TELEPHONE ENCOUNTER
Prior auth started in covermymeds for protonix due to patient GERD.      Awaiting approval or denial

## 2021-10-07 ENCOUNTER — CLINICAL SUPPORT (OUTPATIENT)
Dept: CARDIOLOGY CLINIC | Age: 51
End: 2021-10-07

## 2021-10-07 ENCOUNTER — OFFICE VISIT (OUTPATIENT)
Dept: CARDIOLOGY CLINIC | Age: 51
End: 2021-10-07
Payer: MEDICAID

## 2021-10-07 VITALS
HEART RATE: 69 BPM | BODY MASS INDEX: 33.33 KG/M2 | HEIGHT: 69 IN | WEIGHT: 225 LBS | DIASTOLIC BLOOD PRESSURE: 88 MMHG | OXYGEN SATURATION: 100 % | SYSTOLIC BLOOD PRESSURE: 128 MMHG

## 2021-10-07 DIAGNOSIS — I49.5 SICK SINUS SYNDROME (HCC): Primary | ICD-10-CM

## 2021-10-07 DIAGNOSIS — Z95.818 STATUS POST PLACEMENT OF IMPLANTABLE LOOP RECORDER: ICD-10-CM

## 2021-10-07 DIAGNOSIS — Z99.89 OSA ON CPAP: ICD-10-CM

## 2021-10-07 DIAGNOSIS — K21.9 GASTROESOPHAGEAL REFLUX DISEASE, UNSPECIFIED WHETHER ESOPHAGITIS PRESENT: ICD-10-CM

## 2021-10-07 DIAGNOSIS — I48.0 PAROXYSMAL ATRIAL FIBRILLATION (HCC): ICD-10-CM

## 2021-10-07 DIAGNOSIS — Z98.890 S/P ABLATION OF ATRIAL FIBRILLATION: ICD-10-CM

## 2021-10-07 DIAGNOSIS — G47.33 OSA ON CPAP: ICD-10-CM

## 2021-10-07 DIAGNOSIS — Z95.9 CARDIAC DEVICE IN SITU: ICD-10-CM

## 2021-10-07 DIAGNOSIS — Z86.79 S/P ABLATION OF ATRIAL FIBRILLATION: ICD-10-CM

## 2021-10-07 DIAGNOSIS — Z79.01 ON RIVAROXABAN THERAPY: ICD-10-CM

## 2021-10-07 PROCEDURE — 99215 OFFICE O/P EST HI 40 MIN: CPT | Performed by: INTERNAL MEDICINE

## 2021-10-07 PROCEDURE — 93291 INTERROG DEV EVAL SCRMS IP: CPT | Performed by: INTERNAL MEDICINE

## 2021-10-07 NOTE — PROGRESS NOTES
Ethel Rowell presents today for   Chief Complaint   Patient presents with    Follow-up     discuss PPM       [facility] preferred language for health care discussion is english/other. Is someone accompanying this pt? no    Is the patient using any DME equipment during [address]? no    Depression Screening:  3 most recent PHQ Screens 10/7/2021   Little interest or pleasure in doing things Not at all   Feeling down, depressed, irritable, or hopeless Not at all   Total Score PHQ 2 0       Learning Assessment:  Learning Assessment 4/1/2021   PRIMARY LEARNER Patient   PRIMARY LANGUAGE ENGLISH   LEARNER PREFERENCE PRIMARY DEMONSTRATION   ANSWERED BY patient   RELATIONSHIP SELF       Abuse Screening:  Abuse Screening Questionnaire 7/1/2021   Do you ever feel afraid of your partner? N   Are you in a relationship with someone who physically or mentally threatens you? N   Is it safe for you to go home? Y       Fall Risk  Fall Risk Assessment, last 12 mths 2/10/2021   Able to walk? Yes   Fall in past 12 months? 0   Do you feel unsteady? 0   Are you worried about falling 0       Pt currently taking Anticoagulant therapy? Xarelto     Coordination of Care:  1. Have you been to the ER, urgent care clinic since your last visit? Hospitalized since your last visit? no    2. Have you seen or consulted any other health care providers outside of the [address] [place] [address] since your last visit? Include any pap smears or colon screening.  no

## 2021-10-07 NOTE — PROGRESS NOTES
History of Present Illness:  46year old male here for follow up. He is here to discuss atrial fibrillation treatment options. Last time I saw him was early September. He has had an ablation for atrial flutter and fibrillation, had a loop recorder placed. He was intolerant to Metoprolol due to fatigue. He is now on Cardizem, Xarelto and Flecainide intermittently. His previous event monitor had shown pauses up to 3-4 seconds. He continues to have intermittent palpitations with atrial fibrillation, although the rate is not severely elevated. Impression:  1. Paroxysmal symptomatic a-fib with intolerance to beta blockers and preference not to be on long term anticoagulation. Intermittent Flecainide use, chronic Xarelto use. 2. Hx of PVI with cryoballoon 12/01/20.  3. Right sided atrial flutter ablation January 29, 2021.  4. Hx of sleep apnea, recently starting CPAP machine and titrating appropriately. 5. Hx of GERD, colonoscopy unremarkable, but awaiting EGD later this month. 6. Subcutaneous loop recorder October, 2020. 7. Remote pancreatitis and cholecystitis. 6. Strong family hx of arrhythmias with father with a pacemaker, as well as a-fib. Plan:  I discussed options. At this point I would recommend we completely treat his reflux, as well as sleep apnea. If he is still having symptoms when I see back in 2-3 months, I will refer him for epicardial ablation and likely admission for either Sotalol or Tikosyn. All questions answered. At this point he is not having the profound pauses that he did before, but will continue to monitor, and if he does have them, it may be reasonable to proceed to pacemaker. Past Medical History:   Diagnosis Date    Afib ([facility] [address] 06/2020    After Tick bite     Hiatal hernia     Sleep apnea     Dx 8/27/2021-waiting on cpap       Current Outpatient Medications   Medication Sig Dispense Refill    pantoprazole (PROTONIX) 40 mg tablet Take 1 Tablet by mouth daily.  27 Tablet 6    dilTIAZem ER (CARDIZEM CD) 120 mg capsule Take 1 Capsule by mouth daily. 30 Capsule 5    rivaroxaban (XARELTO) 20 mg tab tablet Take 1 Tab by mouth daily. Indications: treatment to prevent blood clots in chronic atrial fibrillation 30 Tab 3       Social History   reports that he has never smoked. He has never used smokeless tobacco.   reports previous alcohol use. Family History  family history includes Hypertension in his father; Prostate Cancer in his father. Review of Systems  Except as stated above include:  Constitutional: Negative for fever, chills and malaise/fatigue. HEENT: No congestion or recent URI. Gastrointestinal: No nausea, vomiting, abdominal pain, bloody stools. Pulmonary:  Negative except as stated above. Cardiac:  Negative except as stated above. Musculoskeletal: Negative except as stated above. Neurological:  No localized symptoms. Skin:  Negative except as stated above. Psych:  Negative except as stated above. Endocrine:  Negative except as stated above. PHYSICAL EXAM  BP Readings from Last 3 Encounters:   10/07/21 128/88   09/01/21 (!) 150/76   07/01/21 120/76     Pulse Readings from Last 3 Encounters:   10/07/21 69   09/01/21 69   07/01/21 81     Wt Readings from Last 3 Encounters:   10/07/21 102.1 kg (225 lb)   09/01/21 105.2 kg (232 lb)   07/01/21 106.1 kg (234 lb)     General:   Well developed, well groomed. Head/Neck:   No obvious jugular venous distention     No obvious carotid pulsations. No evidence of xanthelasma. Lungs:   No respiratory distress. Clear bilaterally. Heart:  Regular rate and rhythm. Normal S1/S2. Palpation grossly normal.    No significant murmurs, rubs or gallops. ILR intact. Abdomen:   Non-acute abdomen. No obvious pulsations. Extremities:   Intact peripheral pulses. No significant edema. Neurological:   Alert and oriented to person, place, time. No focal neurological deficit visually.   Skin: No obvious rash    Blood Pressure Metric:  Monitor recommended and adjustments stated if needed.

## 2021-10-13 ENCOUNTER — TRANSCRIBE ORDER (OUTPATIENT)
Dept: REGISTRATION | Age: 51
End: 2021-10-13

## 2021-10-13 ENCOUNTER — HOSPITAL ENCOUNTER (OUTPATIENT)
Dept: PREADMISSION TESTING | Age: 51
Discharge: HOME | End: 2021-10-13
Payer: MEDICAID

## 2021-10-13 DIAGNOSIS — Z01.812 BLOOD TESTS PRIOR TO TREATMENT OR PROCEDURE: Primary | ICD-10-CM

## 2021-10-13 DIAGNOSIS — Z01.812 BLOOD TESTS PRIOR TO TREATMENT OR PROCEDURE: ICD-10-CM

## 2021-10-13 PROCEDURE — U0003 INFECTIOUS AGENT DETECTION BY NUCLEIC ACID (DNA OR RNA); SEVERE ACUTE RESPIRATORY SYNDROME CORONAVIRUS 2 (SARS-COV-2) (CORONAVIRUS DISEASE [COVID-19]), AMPLIFIED PROBE TECHNIQUE, MAKING USE OF HIGH THROUGHPUT TECHNOLOGIES AS DESCRIBED BY CMS-2020-01-R: HCPCS

## 2021-10-14 LAB — SARS-COV-2, COV2NT: NOT DETECTED

## 2021-10-14 NOTE — PERIOP NOTES
PRE-SURGICAL INSTRUCTIONS        Patient's Name:  Yamileth Doshi      [id number] Date:  10/14/2021            Covid Testing Date and Time:    Surgery Date:  10/18/2021                1. Do NOT eat or drink anything, including candy, gum, or ice chips after midnight on 10/18, unless you have specific instructions from your surgeon or anesthesia provider to do so.  2. You may brush your teeth before coming to the hospital.  3. No smoking 24 hours prior to the day of surgery. 4. No alcohol 24 hours prior to the day of surgery. 5. No recreational drugs for one week prior to the day of surgery. 6. Leave all valuables, including money/purse, at home. 7. Remove all jewelry, nail polish, acrylic nails, and makeup (including mascara); no lotions powders, deodorant, or perfume/cologne/after shave on the skin. 8. Follow instruction for Hibiclens washes and CHG wipes from surgeon's office. 9. Glasses/contact lenses and dentures may be worn to the hospital.  They will be removed prior to surgery. 10. Call your doctor if symptoms of a cold or illness develop within 24-48 hours prior to your surgery. 11.  If you are having an outpatient procedure, please make arrangements for a responsible ADULT TO [address] [place] [address] and stay with you for 24 hours after your surgery. 12. ONE VISITOR in the hospital at this time for outpatient procedures. Exceptions may be made for surgical admissions, per nursing unit guidelines      Special Instructions:      Bring list of CURRENT medications. Bring CPAP machine. Bring any pertinent legal medical records. Take these medications the morning of surgery with a sip of water:  As directed by MD  Follow physician instructions about stopping anticoagulants. On the day of surgery, come in the main entrance of [facility]. Let the  at the desk know you are there for surgery.   A staff member will come escort you to the surgical area on the second floor. If you have any questions or concerns, please do not hesitate to call:     (Prior to the day of surgery) PAT department:  [phone]   (Day of surgery) Pre-Op department:  [phone]    These surgical instructions were reviewed with Mary Coffey during the [facility] phone call.

## 2021-10-16 ENCOUNTER — ANESTHESIA EVENT (OUTPATIENT)
Dept: ENDOSCOPY | Age: 51
End: 2021-10-16
Payer: MEDICAID

## 2021-10-18 ENCOUNTER — HOSPITAL ENCOUNTER (OUTPATIENT)
Age: 51
Setting detail: OUTPATIENT SURGERY
Discharge: HOME | End: 2021-10-18
Attending: INTERNAL MEDICINE | Admitting: INTERNAL MEDICINE
Payer: MEDICAID

## 2021-10-18 ENCOUNTER — ANESTHESIA (OUTPATIENT)
Dept: ENDOSCOPY | Age: 51
End: 2021-10-18
Payer: MEDICAID

## 2021-10-18 VITALS
SYSTOLIC BLOOD PRESSURE: 129 MMHG | WEIGHT: 131 LBS | HEIGHT: 69 IN | HEART RATE: 113 BPM | OXYGEN SATURATION: 99 % | RESPIRATION RATE: 17 BRPM | TEMPERATURE: 98.7 F | DIASTOLIC BLOOD PRESSURE: 79 MMHG | BODY MASS INDEX: 19.4 KG/M2

## 2021-10-18 LAB
ATRIAL RATE: 122 BPM
CALCULATED R AXIS, ECG10: 54 DEGREES
CALCULATED T AXIS, ECG11: 2 DEGREES
DIAGNOSIS, 93000: NORMAL
Q-T INTERVAL, ECG07: 284 MS
QRS DURATION, ECG06: 82 MS
QTC CALCULATION (BEZET), ECG08: 392 MS
VENTRICULAR RATE, ECG03: 115 BPM

## 2021-10-18 PROCEDURE — 76060000031 HC ANESTHESIA FIRST 0.5 HR: Performed by: INTERNAL MEDICINE

## 2021-10-18 PROCEDURE — 74011250636 HC RX REV CODE- 250/636: Performed by: NURSE ANESTHETIST, CERTIFIED REGISTERED

## 2021-10-18 PROCEDURE — 00731 ANES UPR GI NDSC PX NOS: CPT | Performed by: NURSE ANESTHETIST, CERTIFIED REGISTERED

## 2021-10-18 PROCEDURE — 93005 ELECTROCARDIOGRAM TRACING: CPT

## 2021-10-18 PROCEDURE — 00731 ANES UPR GI NDSC PX NOS: CPT | Performed by: ANESTHESIOLOGY

## 2021-10-18 PROCEDURE — 88305 TISSUE EXAM BY PATHOLOGIST: CPT

## 2021-10-18 PROCEDURE — 77030019988 HC FCPS ENDOSC DISP BSC -B: Performed by: INTERNAL MEDICINE

## 2021-10-18 PROCEDURE — 2709999900 HC NON-CHARGEABLE SUPPLY: Performed by: INTERNAL MEDICINE

## 2021-10-18 PROCEDURE — 76040000019: Performed by: INTERNAL MEDICINE

## 2021-10-18 PROCEDURE — 77030008565 HC TBNG SUC IRR ERBE -B: Performed by: INTERNAL MEDICINE

## 2021-10-18 PROCEDURE — 74011000250 HC RX REV CODE- 250: Performed by: NURSE ANESTHETIST, CERTIFIED REGISTERED

## 2021-10-18 RX ORDER — FLUMAZENIL 0.1 MG/ML
0.2 INJECTION INTRAVENOUS
Status: CANCELLED | OUTPATIENT
Start: 2021-10-18 | End: 2021-10-18

## 2021-10-18 RX ORDER — PROPOFOL 10 MG/ML
INJECTION, EMULSION INTRAVENOUS AS NEEDED
Status: DISCONTINUED | OUTPATIENT
Start: 2021-10-18 | End: 2021-10-18 | Stop reason: HOSPADM

## 2021-10-18 RX ORDER — SODIUM CHLORIDE 0.9 % (FLUSH) 0.9 %
5-40 SYRINGE (ML) INJECTION AS NEEDED
Status: CANCELLED | OUTPATIENT
Start: 2021-10-18

## 2021-10-18 RX ORDER — NALOXONE HYDROCHLORIDE 0.4 MG/ML
0.4 INJECTION, SOLUTION INTRAMUSCULAR; INTRAVENOUS; SUBCUTANEOUS
Status: CANCELLED | OUTPATIENT
Start: 2021-10-18 | End: 2021-10-18

## 2021-10-18 RX ORDER — SODIUM CHLORIDE 0.9 % (FLUSH) 0.9 %
5-40 SYRINGE (ML) INJECTION EVERY 8 HOURS
Status: CANCELLED | OUTPATIENT
Start: 2021-10-18

## 2021-10-18 RX ORDER — LIDOCAINE HYDROCHLORIDE 20 MG/ML
INJECTION, SOLUTION EPIDURAL; INFILTRATION; INTRACAUDAL; PERINEURAL AS NEEDED
Status: DISCONTINUED | OUTPATIENT
Start: 2021-10-18 | End: 2021-10-18 | Stop reason: HOSPADM

## 2021-10-18 RX ORDER — LIDOCAINE HYDROCHLORIDE 10 MG/ML
0.1 INJECTION, SOLUTION EPIDURAL; INFILTRATION; INTRACAUDAL; PERINEURAL AS NEEDED
Status: DISCONTINUED | OUTPATIENT
Start: 2021-10-18 | End: 2021-10-18 | Stop reason: HOSPADM

## 2021-10-18 RX ORDER — SODIUM CHLORIDE, SODIUM LACTATE, POTASSIUM CHLORIDE, CALCIUM CHLORIDE 600; 310; 30; 20 MG/100ML; MG/100ML; MG/100ML; MG/100ML
50 INJECTION, SOLUTION INTRAVENOUS CONTINUOUS
Status: DISCONTINUED | OUTPATIENT
Start: 2021-10-18 | End: 2021-10-18 | Stop reason: HOSPADM

## 2021-10-18 RX ORDER — ATROPINE SULFATE 0.1 MG/ML
0.5 INJECTION INTRAVENOUS
Status: CANCELLED | OUTPATIENT
Start: 2021-10-18 | End: 2021-10-19

## 2021-10-18 RX ORDER — DEXTROMETHORPHAN/PSEUDOEPHED 2.5-7.5/.8
1.2 DROPS ORAL
Status: CANCELLED | OUTPATIENT
Start: 2021-10-18

## 2021-10-18 RX ORDER — EPINEPHRINE 0.1 MG/ML
1 INJECTION INTRACARDIAC; INTRAVENOUS
Status: CANCELLED | OUTPATIENT
Start: 2021-10-18 | End: 2021-10-19

## 2021-10-18 RX ADMIN — PROPOFOL 20 MG: 10 INJECTION, EMULSION INTRAVENOUS at 08:04

## 2021-10-18 RX ADMIN — PROPOFOL 100 MG: 10 INJECTION, EMULSION INTRAVENOUS at 08:00

## 2021-10-18 RX ADMIN — PROPOFOL 20 MG: 10 INJECTION, EMULSION INTRAVENOUS at 08:05

## 2021-10-18 RX ADMIN — PROPOFOL 20 MG: 10 INJECTION, EMULSION INTRAVENOUS at 08:01

## 2021-10-18 RX ADMIN — PROPOFOL 20 MG: 10 INJECTION, EMULSION INTRAVENOUS at 08:06

## 2021-10-18 RX ADMIN — PROPOFOL 20 MG: 10 INJECTION, EMULSION INTRAVENOUS at 08:02

## 2021-10-18 RX ADMIN — SODIUM CHLORIDE, SODIUM LACTATE, POTASSIUM CHLORIDE, AND CALCIUM CHLORIDE 50 ML/HR: 600; 310; 30; 20 INJECTION, SOLUTION INTRAVENOUS at 07:46

## 2021-10-18 RX ADMIN — LIDOCAINE HYDROCHLORIDE 80 MG: 20 INJECTION, SOLUTION EPIDURAL; INFILTRATION; INTRACAUDAL; PERINEURAL at 08:00

## 2021-10-18 NOTE — PROCEDURES
[email or web address]. [address]  [place], [address]      Brief Procedure Note    Tyesha Ochoa [facility]  1970  [id number]    Date of Procedure: 10/18/2021    Preoperative diagnosis: Abdominal pain, epigastric:  789.06 - R10.13  Atrial fibrillation and flutter:  I48.91  Colon cancer Screening:  V76.51 - Z12.11  Encounter for screening for other viral diseases:  V73.0 - Z11.59    Postoperative diagnosis:  Residual food matter in stomach c/w gastroparesis    Type of Anesthesia: MAC (Monitored anesthesia care)    Description of findings: same as post op dx    Procedure: Procedure(s):  UPPER ENDOSCOPY    :  Dr. Rigo Fernandez MD    Assistant(s): Endoscopy Technician-1: Marquis Donahue  Endoscopy RN-1: Annemarie SEN RN    EBL:None    Specimens:   ID Type Source Tests Collected by Time Destination   1 : antrum bxc Preservative Gastric  Juarez Cleaning MD 10/18/2021 2316 Pathology       Findings: See printed and scanned procedure note    Complications: None    Dr. Rigo Fernandez MD  10/18/2021  8:16 AM

## 2021-10-18 NOTE — ANESTHESIA POSTPROCEDURE EVALUATION
Procedure(s):  UPPER ENDOSCOPY with biopsy. MAC    Anesthesia Post Evaluation      Multimodal analgesia: multimodal analgesia used between 6 hours prior to anesthesia start to PACU discharge  Patient location during evaluation: bedside  Patient participation: complete - patient participated  Level of consciousness: awake  Pain management: adequate  Airway patency: patent  Anesthetic complications: no  Cardiovascular status: stable  Respiratory status: acceptable  Hydration status: acceptable  Post anesthesia nausea and vomiting:  controlled      INITIAL Post-op Vital signs:   Vitals Value Taken Time   /91 10/18/21 0834   Temp 37.1 °C (98.7 °F) 10/18/21 0814   Pulse 111 10/18/21 0835   Resp 12 10/18/21 0835   SpO2 98 % 10/18/21 0835   Vitals shown include unvalidated device data.

## 2021-10-18 NOTE — H&P
[email or web address]  [phone]    GASTROENTEROLOGY Pre-Procedure H and P      Impression/Plan:   1. This patient is consented for an EGD for epigastric pain. Chief Complaint: epigastric pain      HPI:  Ppier Singh is a 46 y.o. male who presents for an EGD for evaluation of epigastric pain. He is referred by his cardiologist for GI issues. He has h/o A-fib and flutter s/p ablation and on Diltiazem and Xarelto currently. He has epigastric pain episodically and was started on Protonix 40 mg daily. He is concerned about a hiatal hernia. He feels his sx are triggered when he eats and sits up straight.  He states his sx have been triggered by a tick bite in the past.      PMH:   Past Medical History:   Diagnosis Date    Afib ([address] 06/2020    After Tick bite, did not tolerate beta blockers, on flecanide and xarelto, S/P aflutter ablation on 1/29/2021    Hiatal hernia     Sleep apnea      cpap       PSH:   Past Surgical History:   Procedure Laterality Date    HX CHOLECYSTECTOMY      HX CYST REMOVAL      HX CYST REMOVAL  12/02/2019    HX HERNIA REPAIR      Umbilical   [employer] PACEMAKER      Loop Recorder Metronic -Reveal LINQ [id number]    VT COMPRE ELECTROPHYSIOL XM W/LEFT ATRIAL PACNG/REC N/A 12/1/2020    Lt Atrial Pace & Record During Ep Study performed by Gia Ferreira MD at [facility]    VT COMPRE ELECTROPHYSIOL XM W/LEFT ATRIAL PACNG/REC N/A 1/29/2021    Lt Atrial Pace & Record During Ep Study performed by Gia Ferreira MD at [facility]    VT EPHYS EVAL W/ABLATION [address] N/A 1/29/2021    ABLATION A-FLUTTER/SHAILESH PRIOR performed by Gia Ferreira MD at [facility] CATH [facility]    VT EPHYS EVL TRNSPTL TX ATRIAL FIB ISOLAT PULM VEIN N/A 12/1/2020    ABLATION A-FIB  W COMPLETE EP STUDY/SHAILESH prior performed by Gia Ferreira MD at [facility] CATH LAB       Social HX:   Social History     Socioeconomic History    Marital status: SINGLE     Spouse name: Not on file    Number of children: Not on file    Years of education: Not on file    Highest education level: Not on file   Occupational History    Occupation: cut grass   Tobacco Use    Smoking status: Never Smoker    Smokeless tobacco: Never Used   Vaping Use    Vaping Use: Never used   Substance and Sexual Activity    Alcohol use: Not Currently    Drug use: Never    Sexual activity: Not on file   Other Topics Concern    Not on file   Social History Narrative    Not on file     Social Determinants of Health     Financial Resource Strain:     Difficulty of Paying Living Expenses:    Food Insecurity:     Worried About [address] in the Last Year:     [address] [place] in the Last Year:    Transportation Needs:     Lack of Transportation (Medical):  Lack of Transportation (Non-Medical):    Physical Activity:     Days of Exercise per Week:     Minutes of Exercise per Session:    Stress:     Feeling of Stress :    Social Connections:     Frequency of Communication with Friends and Family:     Frequency of Social Gatherings with Friends and Family:     Attends Gnosticism Services:     Active Member of Clubs or Organizations:     Attends Club or Organization Meetings:     Marital Status:    Intimate Partner Violence:     Fear of Current or Ex-Partner:     Emotionally Abused:     Physically Abused:     Sexually Abused:        FHX:   Family History   Problem Relation Age of Onset    Hypertension Father     Prostate Cancer Father        Allergy:   Allergies   Allergen Reactions    Doxycycline Other (comments)     Tingling all over, vision changes       Home Medications:     Medications Prior to Admission   Medication Sig    pantoprazole (PROTONIX) 40 mg tablet Take 1 Tablet by mouth daily.  dilTIAZem ER (CARDIZEM CD) 120 mg capsule Take 1 Capsule by mouth daily. (Patient taking differently: Take 120 mg by mouth daily (with dinner). )    rivaroxaban (XARELTO) 20 mg tab tablet Take 1 Tab by mouth daily. Indications: treatment to prevent blood clots in chronic atrial fibrillation (Patient taking differently: Take 20 mg by mouth daily (with dinner). Indications: treatment to prevent blood clots in chronic atrial fibrillation)       Review of Systems:     A complete 10 point review of systems was performed and pertinents are as per the HPI. Remainder of the review of systems was negative. Visit Vitals  Ht 5' 9\" (1.753 m)   Wt 102.1 kg (225 lb)   BMI 33.23 kg/m²       Physical Assessment:     General: alert, cooperative, no acute distress, appears stated age. HEENT: normocephalic, no scleral icterus, moist mucous membranes, EOMs intact, no neck masses noted. Respiratory: lungs clear to auscultation bilaterally. Cardiovascular: regular rate and rhythm, no murmurs, rubs or gallops. Abdomen: normal bowel sounds, soft, non-tender to palpation. Extremities: no lower extremity edema, no cyanosis or clubbing. Neuro: alert and oriented x 3; non-focal exam.  Skin: no rashes. Psych: normal mood and affect. Trevor Hanley MD  [facility]  [email or web address]  Phone: [phone]  Pager: [phone]  [email or web address]

## 2021-10-18 NOTE — ANESTHESIA PREPROCEDURE EVALUATION
Relevant Problems   No relevant active problems       Anesthetic History   No history of anesthetic complications            Review of Systems / Medical History  Patient summary reviewed and pertinent labs reviewed    Pulmonary        Sleep apnea  Shortness of breath         Neuro/Psych   Within defined limits           Cardiovascular            Dysrhythmias : atrial fibrillation and atrial flutter      Exercise tolerance: <4 METS  Comments: EF 55%  LVH   GI/Hepatic/Renal               Comments: H/O PANCREATITIS Endo/Other        Morbid obesity     Other Findings   Comments: Pt in A-fib today. Discussed with Dr Anderson Law. If BP/ HR reasonable, rec proceed with EGD.            Physical Exam    Airway  Mallampati: III  TM Distance: > 6 cm  Neck ROM: normal range of motion   Mouth opening: Normal     Cardiovascular  Regular rate and rhythm,  S1 and S2 normal,  no murmur, click, rub, or gallop             Dental  No notable dental hx       Pulmonary  Breath sounds clear to auscultation               Abdominal  GI exam deferred       Other Findings            Anesthetic Plan    ASA: 3  Anesthesia type: MAC          Induction: Intravenous  Anesthetic plan and risks discussed with: Patient

## 2021-10-18 NOTE — DISCHARGE INSTRUCTIONS
Upper GI Endoscopy: What to Expect at [address] [place] [address]  After you have an endoscopy, you will stay at the hospital or clinic for 1 to 2 hours. This will allow the medicine to wear off. You will be able to go home after your doctor or nurse checks to make sure you are not having any problems. You may have to stay overnight if you had treatment during the test. You may have a sore throat for a day or two after the test.  This care sheet gives you a general idea about what to expect after the test.  How can you care for yourself at home? Activity  · Rest as much as you need to after you go home. · You should be able to go back to your usual activities the day after the test.  Diet  · Follow your doctor's directions for eating after the test.  · Drink plenty of fluids (unless your doctor has told you not to). Medications  · If you have a sore throat the day after the test, use an over-the-counter spray to numb your throat. Follow-up care is a key part of your treatment and safety. Be sure to make and go to all appointments, and call your doctor if you are having problems. It's also a good idea to know your test results and keep a list of the medicines you take. When should you call for help? Call 911 anytime you think you may need emergency care. For example, call if:  · You passed out (lost consciousness). · You cough up blood. · You vomit blood or what looks like coffee grounds. · You pass maroon or very bloody stools. Call your doctor now or seek immediate medical care if:  · You have trouble swallowing. · You have belly pain. · Your stools are black and tarlike or have streaks of blood. · You are sick to your stomach or cannot keep fluids down. Watch closely for changes in your health, and be sure to contact your doctor if:  · Your throat still hurts after a day or two. · You do not get better as expected. Where can you learn more?    Go to [employer]  Enter J454 in the search box to learn more about \"Upper GI Endoscopy: What to Expect at Home. \"   © 7065-5672 Healthwise, Incorporated. Care instructions adapted under license by [facility] [employer] (which disclaims liability or warranty for this information). This care instruction is for use with your licensed healthcare professional. If you have questions about a medical condition or this instruction, always ask your healthcare professional. [address] any warranty or liability for your use of this information. Content Version: 54.9.773779; Current as of: November 14, 2014    Patient Education        Gastroparesis: Care Instructions  Overview     When you have gastroparesis, your stomach takes a lot longer to empty. This delay can cause belly pain, bloating, and belching. It also can cause hiccups, heartburn, nausea or vomiting. You may not feel like eating. These symptoms may come and go. They most often occur during and after meals. You may feel full after only a few bites of food. This condition occurs when the nerves or muscles to the stomach don't work properly. Diabetes is one of the most common causes of this nerve damage. Gastroparesis can make it harder to control your blood sugar levels. But keeping your blood sugar levels under control may help with your symptoms. Parkinson's disease, stroke, and some medicines can also cause this condition. Home treatment can often help. Follow-up care is a key part of your treatment and safety. Be sure to make and go to all appointments, and call your doctor if you are having problems. It's also a good idea to know your test results and keep a list of the medicines you take. How can you care for yourself at home? · Eat several small meals each day rather than three large meals. · Eat foods that are low in fiber and fat. · If your doctor suggests it, take medicines that help the stomach empty more quickly. These are called motility agents.   When should you call for help? Call your doctor now or seek immediate medical care if:    · You are vomiting.     · You have new or worse belly pain.     · You have a fever.     · You cannot pass stools or gas. Watch closely for changes in your health, and be sure to contact your doctor if you have any problems. Where can you learn more? Go to http://www.gray.com/  Enter M106 in the search box to learn more about \"Gastroparesis: Care Instructions. \"  Current as of: February 10, 2021               Content Version: 13.0  © 0544-5915 [employer]. Care instructions adapted under license by [employer] (which disclaims liability or warranty for this information). If you have questions about a medical condition or this instruction, always ask your healthcare professional. [place] [address] any warranty or liability for your use of this information. DISCHARGE SUMMARY from Nurse     POST-PROCEDURE INSTRUCTIONS:    Call your Physician if you:  ? Observe any excess bleeding. ? Develop a temperature over 100.5o F.  ? Experience abdominal, shoulder or chest pain. ? Notice any signs of decreased circulation or nerve impairment to an extremity such as a change in color, persistent numbness, tingling, coldness or increase in pain. ? Vomit blood or you have nausea and vomiting lasting longer than 4 hours. ? Are unable to take medications. ? Are unable to urinate within 8 hours after discharge following general anesthesia or intravenous sedation. For the next 24 hours after receiving general anesthesia or intravenous sedation, or while taking prescription Narcotics, limit your activities:  ? Do NOT drive a motor vehicle, operate hazard machinery or power tools, or perform tasks that require coordination. The medication you received during your procedure may have some effect on your mental awareness. ? Do NOT make important personal or business decisions.   The medication you received during your procedure may have some effect on your mental awareness. ? Do NOT drink alcoholic beverages. These drinks do not mix well with the medications that have been given to you. ? Upon discharge from the hospital, you must be accompanied by a responsible adult. ? Resume your diet as directed by your physician. ? Resume medications as your physician has prescribed. ? Please give a list of your current medications to your Primary Care Provider. ? Please update this list whenever your medications are discontinued, doses are changed, or new medications (including over-the-counter products) are added. ? Please carry medication information at all times in case of emergency situations. These are general instructions for a healthy lifestyle:    No smoking/ No tobacco products/ Avoid exposure to second hand smoke.  Surgeon General's Warning:  Quitting smoking now greatly reduces serious risk to your health. Obesity, smoking, and a sedentary lifestyle greatly increase your risk for illness.  A healthy diet, regular physical exercise & weight monitoring are important for maintaining a healthy lifestyle   You may be retaining fluid if you have a history of heart failure or if you experience any of the following symptoms:  Weight gain of 3 pounds or more overnight or 5 pounds in a week, increased swelling in our hands or feet or shortness of breath while lying flat in bed. Please call your doctor as soon as you notice any of these symptoms; do not wait until your next office visit. Colorectal Screening   Colorectal cancer almost always develops from precancerous polyps (abnormal growths) in the colon or rectum. Screening tests can find precancerous polyps, so that they can be removed before they turn into cancer.  Screening tests can also find colorectal cancer early, when treatment works best.  Lito Valles Speak with your physician about when you should begin screening and how often you should be tested  Srinath Andrade   Additional Information    Educational references and/or instructions provided during this visit included:    See attached. APPOINTMENTS:    Per MD Instruction. Discharge information has been reviewed with the patient. The patient verbalized understanding.

## 2021-11-03 NOTE — PROGRESS NOTES
I have personally seen and evaluated the device findings. Interrogation reviewed and I agree with assessment.     Donnie Mack

## 2021-12-06 RX ORDER — DILTIAZEM HYDROCHLORIDE 120 MG/1
120 CAPSULE, COATED, EXTENDED RELEASE ORAL
Qty: 120 CAPSULE | Refills: 5 | Status: SHIPPED | OUTPATIENT
Start: 2021-12-06 | End: 2022-09-14 | Stop reason: ALTCHOICE

## 2021-12-15 ENCOUNTER — CLINICAL SUPPORT (OUTPATIENT)
Dept: CARDIOLOGY CLINIC | Age: 51
End: 2021-12-15

## 2021-12-15 ENCOUNTER — OFFICE VISIT (OUTPATIENT)
Dept: CARDIOLOGY CLINIC | Age: 51
End: 2021-12-15
Payer: MEDICAID

## 2021-12-15 VITALS
HEIGHT: 69 IN | SYSTOLIC BLOOD PRESSURE: 124 MMHG | BODY MASS INDEX: 35.99 KG/M2 | HEART RATE: 62 BPM | WEIGHT: 243 LBS | DIASTOLIC BLOOD PRESSURE: 64 MMHG | OXYGEN SATURATION: 99 %

## 2021-12-15 DIAGNOSIS — Z95.818 STATUS POST PLACEMENT OF IMPLANTABLE LOOP RECORDER: ICD-10-CM

## 2021-12-15 DIAGNOSIS — Z99.89 OSA ON CPAP: ICD-10-CM

## 2021-12-15 DIAGNOSIS — Z95.9 CARDIAC DEVICE IN SITU: ICD-10-CM

## 2021-12-15 DIAGNOSIS — K21.9 GASTROESOPHAGEAL REFLUX DISEASE, UNSPECIFIED WHETHER ESOPHAGITIS PRESENT: ICD-10-CM

## 2021-12-15 DIAGNOSIS — G47.33 OSA ON CPAP: ICD-10-CM

## 2021-12-15 DIAGNOSIS — Z86.79 S/P ABLATION OF ATRIAL FIBRILLATION: ICD-10-CM

## 2021-12-15 DIAGNOSIS — I48.0 PAROXYSMAL ATRIAL FIBRILLATION (HCC): Primary | ICD-10-CM

## 2021-12-15 DIAGNOSIS — Z79.01 ON RIVAROXABAN THERAPY: ICD-10-CM

## 2021-12-15 DIAGNOSIS — Z98.890 S/P ABLATION OF ATRIAL FIBRILLATION: ICD-10-CM

## 2021-12-15 PROCEDURE — 93291 INTERROG DEV EVAL SCRMS IP: CPT | Performed by: INTERNAL MEDICINE

## 2021-12-15 PROCEDURE — 99214 OFFICE O/P EST MOD 30 MIN: CPT | Performed by: INTERNAL MEDICINE

## 2021-12-15 NOTE — PROGRESS NOTES
Faisal Guerra presents today for   Chief Complaint   Patient presents with    Follow-up     2-3 months        Faisal Guerra preferred language for health care discussion is english/other. Is someone accompanying this pt? no    Is the patient using any DME equipment during [address]? no    Depression Screening:  3 most recent PHQ Screens 12/15/2021   Little interest or pleasure in doing things Not at all   Feeling down, depressed, irritable, or hopeless Not at all   Total Score PHQ 2 0       Learning Assessment:  Learning Assessment 4/1/2021   PRIMARY LEARNER Patient   PRIMARY LANGUAGE ENGLISH   LEARNER PREFERENCE PRIMARY DEMONSTRATION   ANSWERED BY patient   RELATIONSHIP SELF       Abuse Screening:  Abuse Screening Questionnaire 7/1/2021   Do you ever feel afraid of your partner? N   Are you in a relationship with someone who physically or mentally threatens you? N   Is it safe for you to go home? Y       Fall Risk  Fall Risk Assessment, last 12 mths 2/10/2021   Able to walk? Yes   Fall in past 12 months? 0   Do you feel unsteady? 0   Are you worried about falling 0       Pt currently taking Anticoagulant therapy? Xarelto     Coordination of Care:  1. Have you been to the ER, urgent care clinic since your last visit? Hospitalized since your last visit? no    2. Have you seen or consulted any other health care providers outside of the [address] [place] [address] since your last visit? Include any pap smears or colon screening.  no

## 2021-12-15 NOTE — PROGRESS NOTES
History of Present Illness:  46year old male here for follow up. I last saw him October 7th. At that time we discussed additional treatment options for his atrial fibrillation. He had an ablation for atrial flutter on the right side, as well as atrial fibrillation with PVI with cryo December, 2020. He had a loop recorder placed to monitor. He was intolerant to Metoprolol due to fatigue. He is on Cardizem, Xarelto, Flecainide intermittently. He has had some previous pauses three to four seconds, but not recurrent. The last time I saw him, he was completing his GI evaluation and he subsequently had an endoscopy with biopsy. He also had sleep apnea machine that has been titrated and using nasal attachment. He has been seen by at least two other surgeons to discuss epicardial ablations on the posterior wall versus ganglia approach. Impression:  1. Paroxysmal symptomatic a-fib with intolerance to beta blockers, preference not to be on long term anticoagulation. Intermittent Flecainide use and Cardizem. Chronic Xarelto at this time. 2. History of PVI with cryoballoon December, 2020.  3. History of right sided a-fib ablation January 29, 2021. 4. History of sleep apnea, recently started CPAP. 5. History of GERD with unremarkable colonoscopy. Extensive GI workup with EGD with some mild inflammation. 6. Subcutaneous loop recorder October, 2020. 7. Remote pancreatitis and cholecystitis. 6. Strong family history of arrhythmias with father who had a pacemaker, as well as a-fib. 9. BMI of 35. Plan: At this point we talked about proceeding with an epicardial approach for an ablation and either considering Sotalol or Tikosyn loading. He is going to talk with Dr. Roman Martines and has talked with at least two other surgeons as well about different surgical approaches to ablation. I will tentatively see back in six months.       Past Medical History:   Diagnosis Date    Afib ([facility] [address] 06/2020    After Tick bite, did not tolerate beta blockers, on flecanide and xarelto, S/P aflutter ablation on 1/29/2021    Hiatal hernia     Sleep apnea      cpap       Current Outpatient Medications   Medication Sig Dispense Refill    dilTIAZem ER (CARDIZEM CD) 120 mg capsule Take 1 Capsule by mouth daily (with dinner). 120 Capsule 5    rivaroxaban (XARELTO) 20 mg tab tablet Take 1 Tab by mouth daily. Indications: treatment to prevent blood clots in chronic atrial fibrillation (Patient taking differently: Take 20 mg by mouth daily (with dinner). Indications: treatment to prevent blood clots in chronic atrial fibrillation) 30 Tab 3       Social History   reports that he has never smoked. He has never used smokeless tobacco.   reports previous alcohol use. Family History  family history includes Hypertension in his father; Prostate Cancer in his father. Review of Systems  Except as stated above include:  Constitutional: Negative for fever, chills and malaise/fatigue. HEENT: No congestion or recent URI. Gastrointestinal: No nausea, vomiting, abdominal pain, bloody stools. Pulmonary:  Negative except as stated above. Cardiac:  Negative except as stated above. Musculoskeletal: Negative except as stated above. Neurological:  No localized symptoms. Skin:  Negative except as stated above. Psych:  Negative except as stated above. Endocrine:  Negative except as stated above. PHYSICAL EXAM  BP Readings from Last 3 Encounters:   12/15/21 124/64   10/18/21 (!) (P) 134/90   10/07/21 128/88     Pulse Readings from Last 3 Encounters:   12/15/21 62   10/18/21 (P) 98   10/07/21 69     Wt Readings from Last 3 Encounters:   12/15/21 110.2 kg (243 lb)   10/18/21 59.4 kg (131 lb)   10/07/21 102.1 kg (225 lb)     General:   Well developed, well groomed. Head/Neck:   No obvious jugular venous distention     No obvious carotid pulsations. No evidence of xanthelasma. Lungs:   No respiratory distress.       Clear bilaterally. Heart:  Regular rate and rhythm. Normal S1/S2. Palpation grossly normal.    No significant murmurs, rubs or gallops. Abdomen:   Non-acute abdomen. No obvious pulsations. Extremities:   Intact peripheral pulses. No significant edema. Neurological:   Alert and oriented to person, place, time. No focal neurological deficit visually. Skin:   No obvious rash    Blood Pressure Metric:  Monitor recommended and adjustments stated if needed.

## 2021-12-16 NOTE — PROGRESS NOTES
I have personally seen and evaluated the device findings. Interrogation reviewed and I agree with assessment.     Benita Caicedo

## 2021-12-29 ENCOUNTER — OFFICE VISIT (OUTPATIENT)
Dept: CARDIOTHORACIC SURGERY | Age: 51
End: 2021-12-29
Payer: MEDICAID

## 2021-12-29 VITALS
BODY MASS INDEX: 36.88 KG/M2 | OXYGEN SATURATION: 100 % | WEIGHT: 249 LBS | RESPIRATION RATE: 18 BRPM | SYSTOLIC BLOOD PRESSURE: 134 MMHG | HEART RATE: 54 BPM | TEMPERATURE: 98.1 F | HEIGHT: 69 IN | DIASTOLIC BLOOD PRESSURE: 83 MMHG

## 2021-12-29 DIAGNOSIS — I48.0 PAROXYSMAL ATRIAL FIBRILLATION (HCC): Primary | ICD-10-CM

## 2021-12-29 PROCEDURE — 99215 OFFICE O/P EST HI 40 MIN: CPT | Performed by: SPECIALIST

## 2021-12-29 NOTE — PROGRESS NOTES
[facility]  -  Consult      12/29/2021      Josette Camejo is a 46 y.o. male who is being seen on consult for consideration of epicardial ablation, at  Dr. Sonia Dunacn request.      Assessment:     Patient Active Problem List    Diagnosis Date Noted    Atrial fibrillation ([facility] [address] 12/01/2020    S/P ablation of atrial fibrillation 12/01/2020    Severe obesity ([address] 08/10/2020       Subjective:     Chief Complaint   Patient presents with    Referral / Consult     Surgical Epicardial ablation       History of Present Illness: The patient is a 14-year-old male who has had persistent issues with symptomatic atrial fibrillation. He is status post cryoballoon of the pulmonary veins as well as an ablation for atrial flutter. He continues to have symptoms however. He often associates the onset of his episodes of atrial fibrillation with various GI complaints (abdominal discomfort, reflux, burping etc.). He is very aware when he is in atrial fibrillation. He has spoken to 2 other physicians prior to this visit regarding epicardial ablation.       Past Medical History:     Past Medical History:   Diagnosis Date    Afib ([facility] [address] 06/2020    After Tick bite, did not tolerate beta blockers, on flecanide and xarelto, S/P aflutter ablation on 1/29/2021    Hiatal hernia     Sleep apnea      cpap       Past Surgical History:     Past Surgical History:   Procedure Laterality Date    HX CHOLECYSTECTOMY      HX CYST REMOVAL      HX CYST REMOVAL  12/02/2019    HX HERNIA REPAIR      Umbilical   [employer] PACEMAKER      Loop Recorder Metronic -Reveal LINQ [id number]    IL COMPRE ELECTROPHYSIOL XM W/LEFT ATRIAL PACNG/REC N/A 12/1/2020    Lt Atrial Pace & Record During Ep Study performed by Alex Vo MD at [facility] CATH LAB    IL COMPRE ELECTROPHYSIOL XM W/LEFT ATRIAL PACNG/REC N/A 1/29/2021    Lt Atrial Pace & Record During Ep Study performed by Alex Vo MD at [facility] CARDIAC CATH LAB    PA EPHYS EVAL W/ABLATION SUPRAVENT ARRHYTHMIA N/A 1/29/2021    ABLATION A-FLUTTER/SHAILESH PRIOR performed by Sandor Figueroa MD at [facility] CATH LAB    PA EPHYS EVL TRNSPTL TX ATRIAL FIB ISOLAT PULM VEIN N/A 12/1/2020    ABLATION A-FIB  W COMPLETE EP STUDY/SHAILESH prior performed by Sandor Figueroa MD at [facility] CATH LAB       Social History:   He  reports that he has never smoked. He has never used smokeless tobacco.  He  reports previous alcohol use. Family History:     Family History   Problem Relation Age of Onset    Hypertension Father     Prostate Cancer Father        Allergies and Intolerances: Allergies   Allergen Reactions    Doxycycline Other (comments)     Tingling all over, vision changes  Other reaction(s): Unknown       Home Medications:     Cannot display prior to admission medications because the patient has not been admitted in this contact. Current Outpatient Medications   Medication Sig Dispense Refill    dilTIAZem ER (CARDIZEM CD) 120 mg capsule Take 1 Capsule by mouth daily (with dinner). 120 Capsule 5    rivaroxaban (XARELTO) 20 mg tab tablet Take 1 Tab by mouth daily. Indications: treatment to prevent blood clots in chronic atrial fibrillation (Patient taking differently: Take 20 mg by mouth daily (with dinner). Indications: treatment to prevent blood clots in chronic atrial fibrillation) 30 Tab 3   . Review of Systems:   Except as stated above include:  Constitutional: Negative for fever, chills and malaise/fatigue. HEENT: No congestion or recent URI. Gastrointestinal: Frequent various GI complaints with which the   patient believes triggered his atrial fibrillation. Positive for history of pilonidal cyst.  Pulmonary:  Negative except as stated above. Cardiac:  Negative except as stated above. Musculoskeletal: Negative except as stated above. Neurological:  No localized symptoms. Skin:  Negative except as stated above.   Psych:  Negative except as stated above. Endocrine:  Negative except as stated above. Physical Examination:     Visit Vitals  /83 (BP 1 Location: Left upper arm, BP Patient Position: Sitting, BP Cuff Size: Adult)   Pulse (!) 54   Temp 98.1 °F (36.7 °C) (Temporal)   Resp 18   Ht 5' 9\" (1.753 m)   Wt 112.9 kg (249 lb)   SpO2 100%   BMI 36.77 kg/m²       General: alert, cooperative, no acute distress, appears stated age. HEENT: normocephalic, no scleral icterus, moist mucous membranes, EOMs intact, no neck masses noted. Respiratory: lungs clear to auscultation bilaterally. Cardiovascular: regular rate and rhythm, no murmurs, rubs or gallops. Abdomen: normal bowel sounds, soft, non-tender to palpation. Extremities: no lower extremity edema, no cyanosis or clubbing. Neuro: alert and oriented x 3; non-focal exam.  Skin: no rashes. Psych: normal mood and affect. Laboratory Data:     Lab Results   Component Value Date/Time    WBC 5.9 01/25/2021 12:00 AM    HGB 17.2 01/25/2021 12:00 AM    HCT 50.0 01/25/2021 12:00 AM    PLATELET 235 33/39/6350 12:00 AM     Lab Results   Component Value Date/Time    Sodium 142 01/25/2021 12:00 AM    Potassium 4.7 01/25/2021 12:00 AM    Chloride 105 01/25/2021 12:00 AM    CO2 21 01/25/2021 12:00 AM    Glucose 86 01/25/2021 12:00 AM    BUN 14 01/25/2021 12:00 AM    Creatinine 1.07 01/25/2021 12:00 AM     No results found for: CHOL, CHOLX, CHLST, CHOLV, HDL, HDLP, LDL, LDLC, DLDLP, TGLX, TRIGL, TRIGP  No results found for: HBA1C, QNK9UJTR, QUS3ZUHC  No results for input(s): CA, PHOS, ALB, TP, ALKP, TBILI in the last 72 hours. No lab exists for component: SGOT, SGPT, CBILI  ABG:  No results found for: PH, PHI, PCO2, PCO2I, PO2, PO2I, HCO3, HCO3I, FIO2, FIO2I  No results for input(s): TROIQ, CPK, CKMB in the last 72 hours.     EKG:   Atrial fibrillation with rapid ventricular response   Abnormal ECG   When compared with ECG of 01-DEC-2020 14:51,   Atrial fibrillation has replaced Sinus rhythm Vent. rate has increased BY  50 BPM   Confirmed by Prabhjot Polk ([id number]) on 10/18/2021 5:37:55 PM     ASSESSMENT AND PLAN:    The patient is a 59-year-old male with very symptomatic atrial fibrillation. He is interested in epicardial ablation or possibly a thoracoscopic maze. We spent a great deal of time talking about the risks and benefits of various procedures as well as the efficacy of epicardial ablation. I feel that the patient is a good candidate for posterior left atrial ablation (epicardial). He is not sure that he wishes to commit at this time. He is very interested in having a left atrial appendage clip placed should he undergo the operative ablation. Patient is going to follow-up with his physician and will let us know if he wishes to proceed with us.     Deborah Sol MD [facility]  Chief, Cardiothoracic Surgery   [facility]  [phone]

## 2021-12-29 NOTE — PROGRESS NOTES
Chief Complaint   Patient presents with    Referral / Consult     Surgical Epicardial ablation     1. Have you been to the ER, urgent care clinic since your last visit? Hospitalized since your last visit? No    2. Have you seen or consulted any other health care providers outside of the [address] [place] [address] since your last visit? Include any pap smears or colon screening.  No    Pito Mcghee LPN

## 2022-01-04 RX ORDER — PANTOPRAZOLE SODIUM 40 MG/1
40 TABLET, DELAYED RELEASE ORAL DAILY
Qty: 30 TABLET | Refills: 6 | Status: SHIPPED | OUTPATIENT
Start: 2022-01-04 | End: 2022-07-20

## 2022-01-04 NOTE — PROGRESS NOTES
PAF noted, patient contemplating another ablation. I have personally seen and evaluated the device findings. Interrogation reviewed and I agree with assessment.     Rick Addison

## 2022-02-02 ENCOUNTER — TELEPHONE (OUTPATIENT)
Dept: CARDIOLOGY CLINIC | Age: 52
End: 2022-02-02

## 2022-02-02 NOTE — TELEPHONE ENCOUNTER
Patient had unscheduled transmission due to afib episodes and also 3 pauses , longest was 4 sec. Reviewed transmissions with Dr Jatinder Buchanan    Per Dr Jatinder Buchanan patient was scheduled to see a couple of surgeons about ablations but not sure if he was seen or had any procedures done.      Spoke to patient he states he is seeing Dr. Annia Rosado and is scheduled for a mini MAZES procedure March 1st.     Will send transmission to surgeon to let them have updated information     Faxed transmission today

## 2022-03-18 PROBLEM — Z86.79 S/P ABLATION OF ATRIAL FIBRILLATION: Status: ACTIVE | Noted: 2020-12-01

## 2022-03-18 PROBLEM — Z98.890 S/P ABLATION OF ATRIAL FIBRILLATION: Status: ACTIVE | Noted: 2020-12-01

## 2022-03-19 PROBLEM — E66.01 SEVERE OBESITY (HCC): Status: ACTIVE | Noted: 2020-08-10

## 2022-03-19 PROBLEM — I48.91 ATRIAL FIBRILLATION (HCC): Status: ACTIVE | Noted: 2020-12-01

## 2022-03-23 ENCOUNTER — OFFICE VISIT (OUTPATIENT)
Dept: CARDIOLOGY CLINIC | Age: 52
End: 2022-03-23
Payer: MEDICAID

## 2022-03-23 DIAGNOSIS — Z95.9 CARDIAC DEVICE IN SITU: ICD-10-CM

## 2022-03-23 DIAGNOSIS — I48.0 PAROXYSMAL ATRIAL FIBRILLATION (HCC): Primary | ICD-10-CM

## 2022-04-04 PROCEDURE — 93298 REM INTERROG DEV EVAL SCRMS: CPT | Performed by: INTERNAL MEDICINE

## 2022-04-04 NOTE — PROGRESS NOTES
Episode of A.fib/RVR noted, last 2/28    I have personally seen and evaluated the device findings. Interrogation reviewed and I agree with assessment.     Steven Capps

## 2022-07-20 RX ORDER — PANTOPRAZOLE SODIUM 40 MG/1
40 TABLET, DELAYED RELEASE ORAL DAILY
Qty: 30 TABLET | Refills: 6 | Status: SHIPPED | OUTPATIENT
Start: 2022-07-20 | End: 2022-09-14 | Stop reason: ALTCHOICE

## 2022-08-15 ENCOUNTER — TRANSCRIBE ORDER (OUTPATIENT)
Dept: SCHEDULING | Age: 52
End: 2022-08-15

## 2022-08-15 DIAGNOSIS — R06.02 SHORTNESS OF BREATH: Primary | ICD-10-CM

## 2022-09-12 ENCOUNTER — HOSPITAL ENCOUNTER (OUTPATIENT)
Dept: NON INVASIVE DIAGNOSTICS | Age: 52
Discharge: HOME | End: 2022-09-12
Attending: NURSE PRACTITIONER
Payer: MEDICAID

## 2022-09-12 VITALS
BODY MASS INDEX: 38.51 KG/M2 | WEIGHT: 260 LBS | SYSTOLIC BLOOD PRESSURE: 144 MMHG | HEIGHT: 69 IN | DIASTOLIC BLOOD PRESSURE: 86 MMHG

## 2022-09-12 DIAGNOSIS — R06.02 SHORTNESS OF BREATH: ICD-10-CM

## 2022-09-12 LAB
ECHO AO ASC DIAM: 3.4 CM
ECHO AO ASCENDING AORTA INDEX: 1.47 CM/M2
ECHO AO ROOT DIAM: 3.4 CM
ECHO AO ROOT INDEX: 1.47 CM/M2
ECHO AV AREA PEAK VELOCITY: 4.6 CM2
ECHO AV AREA VTI: 4.5 CM2
ECHO AV AREA/BSA PEAK VELOCITY: 2 CM2/M2
ECHO AV AREA/BSA VTI: 1.9 CM2/M2
ECHO AV MEAN GRADIENT: 2 MMHG
ECHO AV MEAN VELOCITY: 0.7 M/S
ECHO AV PEAK GRADIENT: 4 MMHG
ECHO AV PEAK VELOCITY: 1 M/S
ECHO AV VELOCITY RATIO: 1.1
ECHO AV VTI: 21.1 CM
ECHO EST RA PRESSURE: 3 MMHG
ECHO LA AREA 2C: 26.3 CM2
ECHO LA AREA 4C: 23.4 CM2
ECHO LA DIAMETER INDEX: 1.9 CM/M2
ECHO LA DIAMETER: 4.4 CM
ECHO LA MAJOR AXIS: 6 CM
ECHO LA MINOR AXIS: 6.7 CM
ECHO LA TO AORTIC ROOT RATIO: 1.29
ECHO LA VOL BP: 82 ML (ref 18–58)
ECHO LA VOL/BSA BIPLANE: 35 ML/M2 (ref 16–34)
ECHO LV EDV A2C: 101 ML
ECHO LV EDV A4C: 90 ML
ECHO LV EDV INDEX A4C: 39 ML/M2
ECHO LV EDV NDEX A2C: 44 ML/M2
ECHO LV EJECTION FRACTION A2C: 61 %
ECHO LV EJECTION FRACTION A4C: 58 %
ECHO LV EJECTION FRACTION BIPLANE: 59 % (ref 55–100)
ECHO LV ESV A2C: 40 ML
ECHO LV ESV A4C: 37 ML
ECHO LV ESV INDEX A2C: 17 ML/M2
ECHO LV ESV INDEX A4C: 16 ML/M2
ECHO LV FRACTIONAL SHORTENING: 28 % (ref 28–44)
ECHO LV INTERNAL DIMENSION DIASTOLE INDEX: 1.99 CM/M2
ECHO LV INTERNAL DIMENSION DIASTOLIC: 4.6 CM (ref 4.2–5.9)
ECHO LV INTERNAL DIMENSION SYSTOLIC INDEX: 1.43 CM/M2
ECHO LV INTERNAL DIMENSION SYSTOLIC: 3.3 CM
ECHO LV IVSD: 1.1 CM (ref 0.6–1)
ECHO LV MASS 2D: 169.9 G (ref 88–224)
ECHO LV MASS INDEX 2D: 73.5 G/M2 (ref 49–115)
ECHO LV POSTERIOR WALL DIASTOLIC: 1 CM (ref 0.6–1)
ECHO LV RELATIVE WALL THICKNESS RATIO: 0.43
ECHO LVOT AREA: 4.2 CM2
ECHO LVOT AV VTI INDEX: 1.09
ECHO LVOT DIAM: 2.3 CM
ECHO LVOT MEAN GRADIENT: 2 MMHG
ECHO LVOT PEAK GRADIENT: 5 MMHG
ECHO LVOT PEAK VELOCITY: 1.1 M/S
ECHO LVOT STROKE VOLUME INDEX: 41.5 ML/M2
ECHO LVOT SV: 95.9 ML
ECHO LVOT VTI: 23.1 CM
ECHO MV AREA VTI: 3.6 CM2
ECHO MV LVOT VTI INDEX: 1.16
ECHO MV MAX VELOCITY: 0.9 M/S
ECHO MV MEAN GRADIENT: 2 MMHG
ECHO MV MEAN VELOCITY: 0.5 M/S
ECHO MV PEAK GRADIENT: 3 MMHG
ECHO MV VTI: 26.8 CM
ECHO PULMONARY ARTERY END DIASTOLIC PRESSURE: 5 MMHG
ECHO PV MAX VELOCITY: 1 M/S
ECHO PV PEAK GRADIENT: 4 MMHG
ECHO PV REGURGITANT MAX VELOCITY: 1.1 M/S
ECHO RA AREA 4C: 18.8 CM2
ECHO RA END SYSTOLIC VOLUME APICAL 4 CHAMBER INDEX BSA: 22 ML/M2
ECHO RA VOLUME: 50 ML
ECHO RIGHT VENTRICULAR SYSTOLIC PRESSURE (RVSP): 32 MMHG
ECHO RV BASAL DIMENSION: 4.7 CM
ECHO RV FREE WALL STRAIN: -18 %
ECHO RV LONGITUDINAL DIMENSION: 7.5 CM
ECHO RV MID DIMENSION: 3.9 CM
ECHO RV TAPSE: 1.6 CM (ref 1.7–?)
ECHO TV REGURGITANT MAX VELOCITY: 2.68 M/S
ECHO TV REGURGITANT PEAK GRADIENT: 29 MMHG

## 2022-09-12 PROCEDURE — 93356 MYOCRD STRAIN IMG SPCKL TRCK: CPT

## 2022-09-14 RX ORDER — MAGNESIUM 200 MG
400 TABLET ORAL 2 TIMES DAILY
COMMUNITY

## 2022-09-14 RX ORDER — METOPROLOL TARTRATE 25 MG/1
6.25 TABLET, FILM COATED ORAL 2 TIMES DAILY
COMMUNITY

## 2022-09-14 NOTE — PERIOP NOTES
PRE-SURGICAL INSTRUCTIONS        Patient's Name:  Emi Valera      Today's Date:  9/14/2022      Surgery Date:  9/19/2022                Do NOT eat or drink anything, including candy, gum, or ice chips after midnight on 9/18/2022, unless you have specific instructions from your surgeon or anesthesia provider to do so. You may brush your teeth before coming to the hospital.  No smoking 24 hours prior to the day of surgery. No alcohol 24 hours prior to the day of surgery. No recreational drugs for one week prior to the day of surgery. Leave all valuables, including money/purse, at home. Remove all jewelry, nail polish, ; no lotions powders, deodorant, or perfume/cologne/after shave on the skin. Follow instruction for Hibiclens washes and CHG wipes from surgeon's office. Glasses/contact lenses and dentures may be worn to the hospital.  They will be removed prior to surgery. Call your doctor if symptoms of a cold or illness develop within 24-48 hours prior to your surgery. 11.  If you are having an outpatient procedure, please make arrangements for a responsible ADULT TO [address] [place] [address] and stay with you for 24 hours after your surgery. 12. ONE VISITOR in the hospital at this time for outpatient procedures. Exceptions may be made for surgical admissions, per nursing unit guidelines      Special Instructions:      Bring list of CURRENT medications. Bring CPAP machine. Bring any pertinent legal medical records. Take these medications the morning of surgery with a sip of water:  9/18/2022  Follow physician instructions about stopping anticoagulants. Complete bowel prep per MD instructions. On the day of surgery, come in the main entrance of [facility]. Let the  at the desk know you are there for surgery. A staff member will come escort you to the surgical area on the second floor.     If you have any questions or concerns, please do not hesitate to call:     (Prior to the day of surgery) [facility] department:  [phone]   (Day of surgery) Pre-Op department:  [phone]    These surgical instructions were reviewed with patient during the PAT phone call.

## 2022-09-16 ENCOUNTER — ANESTHESIA EVENT (OUTPATIENT)
Dept: ENDOSCOPY | Age: 52
End: 2022-09-16
Payer: MEDICAID

## 2022-09-19 ENCOUNTER — HOSPITAL ENCOUNTER (OUTPATIENT)
Age: 52
Setting detail: OUTPATIENT SURGERY
Discharge: HOME | End: 2022-09-19
Attending: INTERNAL MEDICINE | Admitting: INTERNAL MEDICINE
Payer: MEDICAID

## 2022-09-19 ENCOUNTER — ANESTHESIA (OUTPATIENT)
Dept: ENDOSCOPY | Age: 52
End: 2022-09-19
Payer: MEDICAID

## 2022-09-19 VITALS
OXYGEN SATURATION: 99 % | DIASTOLIC BLOOD PRESSURE: 74 MMHG | TEMPERATURE: 96.9 F | RESPIRATION RATE: 16 BRPM | HEIGHT: 69 IN | BODY MASS INDEX: 38.36 KG/M2 | WEIGHT: 259 LBS | SYSTOLIC BLOOD PRESSURE: 114 MMHG | HEART RATE: 59 BPM

## 2022-09-19 PROCEDURE — 74011250636 HC RX REV CODE- 250/636: Performed by: NURSE ANESTHETIST, CERTIFIED REGISTERED

## 2022-09-19 PROCEDURE — 77030021593 HC FCPS BIOP ENDOSC BSC -A: Performed by: INTERNAL MEDICINE

## 2022-09-19 PROCEDURE — 2709999900 HC NON-CHARGEABLE SUPPLY: Performed by: INTERNAL MEDICINE

## 2022-09-19 PROCEDURE — 00812 ANES LWR INTST SCR COLSC: CPT | Performed by: NURSE ANESTHETIST, CERTIFIED REGISTERED

## 2022-09-19 PROCEDURE — 77030008565 HC TBNG SUC IRR ERBE -B: Performed by: INTERNAL MEDICINE

## 2022-09-19 PROCEDURE — 00812 ANES LWR INTST SCR COLSC: CPT | Performed by: ANESTHESIOLOGY

## 2022-09-19 PROCEDURE — 74011000250 HC RX REV CODE- 250: Performed by: NURSE ANESTHETIST, CERTIFIED REGISTERED

## 2022-09-19 PROCEDURE — 74011250637 HC RX REV CODE- 250/637: Performed by: NURSE ANESTHETIST, CERTIFIED REGISTERED

## 2022-09-19 PROCEDURE — 77030013992 HC SNR POLYP ENDOSC BSC -B: Performed by: INTERNAL MEDICINE

## 2022-09-19 PROCEDURE — 74011250637 HC RX REV CODE- 250/637: Performed by: INTERNAL MEDICINE

## 2022-09-19 PROCEDURE — 76060000032 HC ANESTHESIA 0.5 TO 1 HR: Performed by: INTERNAL MEDICINE

## 2022-09-19 PROCEDURE — 88305 TISSUE EXAM BY PATHOLOGIST: CPT

## 2022-09-19 PROCEDURE — 76040000007: Performed by: INTERNAL MEDICINE

## 2022-09-19 RX ORDER — INSULIN LISPRO 100 [IU]/ML
INJECTION, SOLUTION INTRAVENOUS; SUBCUTANEOUS ONCE
Status: DISCONTINUED | OUTPATIENT
Start: 2022-09-19 | End: 2022-09-19 | Stop reason: HOSPADM

## 2022-09-19 RX ORDER — FLUMAZENIL 0.1 MG/ML
0.2 INJECTION INTRAVENOUS
Status: CANCELLED | OUTPATIENT
Start: 2022-09-19 | End: 2022-09-19

## 2022-09-19 RX ORDER — NALOXONE HYDROCHLORIDE 0.4 MG/ML
0.4 INJECTION, SOLUTION INTRAMUSCULAR; INTRAVENOUS; SUBCUTANEOUS
Status: CANCELLED | OUTPATIENT
Start: 2022-09-19 | End: 2022-09-19

## 2022-09-19 RX ORDER — DEXTROMETHORPHAN/PSEUDOEPHED 2.5-7.5/.8
1.2 DROPS ORAL
Status: CANCELLED | OUTPATIENT
Start: 2022-09-19

## 2022-09-19 RX ORDER — PROPOFOL 10 MG/ML
INJECTION, EMULSION INTRAVENOUS AS NEEDED
Status: DISCONTINUED | OUTPATIENT
Start: 2022-09-19 | End: 2022-09-19 | Stop reason: HOSPADM

## 2022-09-19 RX ORDER — ATROPINE SULFATE 0.1 MG/ML
0.5 INJECTION INTRAVENOUS
Status: CANCELLED | OUTPATIENT
Start: 2022-09-19 | End: 2022-09-20

## 2022-09-19 RX ORDER — ONDANSETRON 2 MG/ML
4 INJECTION INTRAMUSCULAR; INTRAVENOUS AS NEEDED
Status: DISCONTINUED | OUTPATIENT
Start: 2022-09-19 | End: 2022-09-19 | Stop reason: HOSPADM

## 2022-09-19 RX ORDER — SODIUM CHLORIDE 0.9 % (FLUSH) 0.9 %
5-40 SYRINGE (ML) INJECTION AS NEEDED
Status: DISCONTINUED | OUTPATIENT
Start: 2022-09-19 | End: 2022-09-19 | Stop reason: HOSPADM

## 2022-09-19 RX ORDER — LIDOCAINE HYDROCHLORIDE 20 MG/ML
INJECTION, SOLUTION EPIDURAL; INFILTRATION; INTRACAUDAL; PERINEURAL AS NEEDED
Status: DISCONTINUED | OUTPATIENT
Start: 2022-09-19 | End: 2022-09-19 | Stop reason: HOSPADM

## 2022-09-19 RX ORDER — MAGNESIUM SULFATE 100 %
4 CRYSTALS MISCELLANEOUS AS NEEDED
Status: DISCONTINUED | OUTPATIENT
Start: 2022-09-19 | End: 2022-09-19 | Stop reason: HOSPADM

## 2022-09-19 RX ORDER — SODIUM CHLORIDE 0.9 % (FLUSH) 0.9 %
5-40 SYRINGE (ML) INJECTION AS NEEDED
Status: CANCELLED | OUTPATIENT
Start: 2022-09-19

## 2022-09-19 RX ORDER — EPINEPHRINE 0.1 MG/ML
1 INJECTION INTRACARDIAC; INTRAVENOUS
Status: CANCELLED | OUTPATIENT
Start: 2022-09-19 | End: 2022-09-20

## 2022-09-19 RX ORDER — SODIUM CHLORIDE 0.9 % (FLUSH) 0.9 %
5-40 SYRINGE (ML) INJECTION EVERY 8 HOURS
Status: CANCELLED | OUTPATIENT
Start: 2022-09-19

## 2022-09-19 RX ORDER — SODIUM CHLORIDE 0.9 % (FLUSH) 0.9 %
5-40 SYRINGE (ML) INJECTION EVERY 8 HOURS
Status: DISCONTINUED | OUTPATIENT
Start: 2022-09-19 | End: 2022-09-19 | Stop reason: HOSPADM

## 2022-09-19 RX ORDER — SODIUM CHLORIDE, SODIUM LACTATE, POTASSIUM CHLORIDE, CALCIUM CHLORIDE 600; 310; 30; 20 MG/100ML; MG/100ML; MG/100ML; MG/100ML
75 INJECTION, SOLUTION INTRAVENOUS CONTINUOUS
Status: DISCONTINUED | OUTPATIENT
Start: 2022-09-19 | End: 2022-09-19 | Stop reason: HOSPADM

## 2022-09-19 RX ORDER — FAMOTIDINE 20 MG/1
20 TABLET, FILM COATED ORAL ONCE
Status: COMPLETED | OUTPATIENT
Start: 2022-09-19 | End: 2022-09-19

## 2022-09-19 RX ORDER — LIDOCAINE HYDROCHLORIDE 10 MG/ML
0.1 INJECTION, SOLUTION EPIDURAL; INFILTRATION; INTRACAUDAL; PERINEURAL AS NEEDED
Status: DISCONTINUED | OUTPATIENT
Start: 2022-09-19 | End: 2022-09-19 | Stop reason: HOSPADM

## 2022-09-19 RX ORDER — SODIUM CHLORIDE, SODIUM LACTATE, POTASSIUM CHLORIDE, CALCIUM CHLORIDE 600; 310; 30; 20 MG/100ML; MG/100ML; MG/100ML; MG/100ML
25 INJECTION, SOLUTION INTRAVENOUS CONTINUOUS
Status: DISCONTINUED | OUTPATIENT
Start: 2022-09-19 | End: 2022-09-19 | Stop reason: HOSPADM

## 2022-09-19 RX ORDER — DEXTROMETHORPHAN/PSEUDOEPHED 2.5-7.5/.8
DROPS ORAL AS NEEDED
Status: DISCONTINUED | OUTPATIENT
Start: 2022-09-19 | End: 2022-09-19 | Stop reason: HOSPADM

## 2022-09-19 RX ADMIN — PROPOFOL 20 MG: 10 INJECTION, EMULSION INTRAVENOUS at 11:30

## 2022-09-19 RX ADMIN — FAMOTIDINE 20 MG: 20 TABLET ORAL at 11:08

## 2022-09-19 RX ADMIN — PROPOFOL 50 MG: 10 INJECTION, EMULSION INTRAVENOUS at 11:17

## 2022-09-19 RX ADMIN — PROPOFOL 20 MG: 10 INJECTION, EMULSION INTRAVENOUS at 11:32

## 2022-09-19 RX ADMIN — PROPOFOL 20 MG: 10 INJECTION, EMULSION INTRAVENOUS at 11:34

## 2022-09-19 RX ADMIN — PROPOFOL 20 MG: 10 INJECTION, EMULSION INTRAVENOUS at 11:28

## 2022-09-19 RX ADMIN — PROPOFOL 50 MG: 10 INJECTION, EMULSION INTRAVENOUS at 11:18

## 2022-09-19 RX ADMIN — PROPOFOL 20 MG: 10 INJECTION, EMULSION INTRAVENOUS at 11:26

## 2022-09-19 RX ADMIN — LIDOCAINE HYDROCHLORIDE 60 MG: 20 INJECTION, SOLUTION EPIDURAL; INFILTRATION; INTRACAUDAL; PERINEURAL at 11:17

## 2022-09-19 RX ADMIN — PROPOFOL 20 MG: 10 INJECTION, EMULSION INTRAVENOUS at 11:22

## 2022-09-19 RX ADMIN — PROPOFOL 30 MG: 10 INJECTION, EMULSION INTRAVENOUS at 11:20

## 2022-09-19 RX ADMIN — PROPOFOL 20 MG: 10 INJECTION, EMULSION INTRAVENOUS at 11:38

## 2022-09-19 RX ADMIN — SODIUM CHLORIDE, POTASSIUM CHLORIDE, SODIUM LACTATE AND CALCIUM CHLORIDE 25 ML/HR: 600; 310; 30; 20 INJECTION, SOLUTION INTRAVENOUS at 11:07

## 2022-09-19 RX ADMIN — PROPOFOL 20 MG: 10 INJECTION, EMULSION INTRAVENOUS at 11:24

## 2022-09-19 NOTE — ANESTHESIA PREPROCEDURE EVALUATION
Relevant Problems   CARDIOVASCULAR   (+) Atrial fibrillation (HCC)      ENDOCRINE   (+) Severe obesity (HCC)       Anesthetic History   No history of anesthetic complications            Review of Systems / Medical History  Patient summary reviewed and pertinent labs reviewed    Pulmonary        Sleep apnea: No treatment           Neuro/Psych   Within defined limits           Cardiovascular                  Exercise tolerance: >4 METS  Comments: H/O A Fib-S/P ablation   GI/Hepatic/Renal  Within defined limits              Endo/Other        Morbid obesity     Other Findings              Physical Exam    Airway  Mallampati: II  TM Distance: 4 - 6 cm  Neck ROM: normal range of motion   Mouth opening: Normal     Cardiovascular  Regular rate and rhythm,  S1 and S2 normal,  no murmur, click, rub, or gallop             Dental  No notable dental hx       Pulmonary  Breath sounds clear to auscultation               Abdominal  GI exam deferred       Other Findings            Anesthetic Plan    ASA: 3  Anesthesia type: MAC          Induction: Intravenous  Anesthetic plan and risks discussed with: Patient

## 2022-09-19 NOTE — PROCEDURES
[email or web address]  [employer], [address]      Brief Procedure Note    Marshall Meyer [facility]  1970  [id number]    Date of Procedure: 9/19/2022    Preoperative diagnosis: Atrial fibrillation and flutter ([address] [I48.91, I48.92]  Colon cancer screening [Z12.11]  Abdominal pain, generalized [R10.84]    Postoperative diagnosis: Diverticulosis, ascending polyp x1, Transverse polyp x2, sigmoid  polyp x4    Type of Anesthesia: MAC (Monitored anesthesia care)    Description of findings: same as post op dx    Procedure: Procedure(s):  COLONOSCOPY with polypectomies    :  Dr. Jaquelin Cartwright MD    Assistant(s): Endoscopy Technician-1: Nohemi Neville  Float Staff: Cherelle Hawkins RN    EBL:None    Specimens:   ID Type Source Tests Collected by Time Destination   1 : Ascending polyp  Preservative Colon, Ascending  Carlos Elena MD 9/19/2022 1128 Pathology   2 : Transverse polyps Preservative Colon, Nikki Escobedo MD 9/19/2022 1132 Pathology   3 : sigmoid polyps Preservative Sigmoid  Carlos Elena MD 9/19/2022 1135 Pathology       Findings: See printed and scanned procedure note    Complications: None    Dr. Jaquelin Cartwright MD  9/19/2022  11:50 AM

## 2022-09-19 NOTE — ANESTHESIA POSTPROCEDURE EVALUATION
Procedure(s):  COLONOSCOPY with polypectomies. MAC    Anesthesia Post Evaluation      Multimodal analgesia: multimodal analgesia used between 6 hours prior to anesthesia start to PACU discharge  Patient location during evaluation: bedside  Patient participation: complete - patient participated  Level of consciousness: awake  Pain score: 0  Pain management: adequate  Airway patency: patent  Anesthetic complications: no  Cardiovascular status: stable  Respiratory status: acceptable  Hydration status: acceptable  Post anesthesia nausea and vomiting:  controlled  Final Post Anesthesia Temperature Assessment:  Normothermia (36.0-37.5 degrees C)      INITIAL Post-op Vital signs:   Vitals Value Taken Time   BP 98/53 09/19/22 1229   Temp 36.8 °C (98.2 °F) 09/19/22 1219   Pulse 58 09/19/22 1236   Resp 11 09/19/22 1237   SpO2 100 % 09/19/22 1236   Vitals shown include unvalidated device data.

## 2022-09-19 NOTE — H&P
[email or web address]  [phone]    GASTROENTEROLOGY Pre-Procedure H and P      Impression/Plan:   1. This patient is consented for a colonoscopy for colon cancer screening. Chief Complaint: colon cancer screening. HPI:  Geraldien Villalobos is a 46 y.o. male who presents for a colonoscopy for colon cancer screening.     PMH:   Past Medical History:   Diagnosis Date    Afib ([address] 06/2020    After Tick bite, did not tolerate beta blockers, on flecanide and xarelto, S/P aflutter ablation on 1/29/2021    Hiatal hernia     Sleep apnea      cpap       PSH:   Past Surgical History:   Procedure Laterality Date    HX CHOLECYSTECTOMY      HX CYST REMOVAL  12/02/2019    Pilonidal cyst    HX HERNIA REPAIR      Umbilical    HX IMPLANTABLE LOOP RECORDER  10/2020    Loop Recorder Metronic -Reveal LINQ [id number]    AL CARDIAC SURG PROCEDURE UNLIST  03/01/2022    Mini-Maze ablation for AFib    AL COMPRE ELECTROPHYSIOL XM W/LEFT ATRIAL PACNG/REC N/A 12/01/2020    Lt Atrial Pace & Record During Ep Study performed by Arturo Figueroa MD at [facility] CATH LAB    AL COMPRE ELECTROPHYSIOL XM W/LEFT ATRIAL PACNG/REC N/A 01/29/2021    Lt Atrial Pace & Record During Ep Study performed by Arturo Figueroa MD at [facility] CATH LAB    AL COMPRE EP EVAL ABLTJ 3D MAPG TX SVT N/A 01/29/2021    ABLATION A-FLUTTER/SHAILESH PRIOR performed by Arturo Figueroa MD at [facility] CATH LAB    [address] FIB PULM VEIN ISOLATION N/A 12/01/2020    ABLATION A-FIB  W COMPLETE EP STUDY/SHAILESH prior performed by Arturo Figueroa MD at [facility] CATH LAB       Social HX:   Social History     Socioeconomic History    Marital status: SINGLE     Spouse name: Not on file    Number of children: Not on file    Years of education: Not on file    Highest education level: Not on file   Occupational History    Occupation: cut grass   Tobacco Use    Smoking status: Never    Smokeless tobacco: Never   Vaping Use    Vaping Use: Never used Substance and Sexual Activity    Alcohol use: Not Currently    Drug use: Never    Sexual activity: Not on file   Other Topics Concern    Not on file   Social History Narrative    Not on file     Social Determinants of Health     Financial Resource Strain: Not on file   Food Insecurity: Not on file   Transportation Needs: Not on file   Physical Activity: Not on file   Stress: Not on file   Social Connections: Not on file   Intimate Partner Violence: Not on file   Housing Stability: Not on file       FHX:   Family History   Problem Relation Age of Onset    Hypertension Father     Prostate Cancer Father        Allergy:   Allergies   Allergen Reactions    Doxycycline Other (comments)     Tingling all over, vision changes  Other reaction(s): Unknown       Home Medications:     Medications Prior to Admission   Medication Sig    metoprolol tartrate (LOPRESSOR) 25 mg tablet Take 6.25 mg by mouth two (2) times a day. 6.25mg twice a day    magnesium 200 mg tab Take 400 mg by mouth two (2) times a day. rivaroxaban (XARELTO) 20 mg tab tablet Take 1 Tab by mouth daily. Indications: treatment to prevent blood clots in chronic atrial fibrillation (Patient taking differently: Take 20 mg by mouth daily (with dinner). Indications: treatment to prevent blood clots in chronic atrial fibrillation)       Review of Systems:     A complete 10 point review of systems was performed and pertinents are as per the HPI. Remainder of the review of systems was negative. Visit Vitals  Ht 5' 9\" (1.753 m)   Wt 117.5 kg (259 lb)   BMI 38.25 kg/m²       Physical Assessment:     General: alert, cooperative, no acute distress, appears stated age. HEENT: normocephalic, no scleral icterus, moist mucous membranes, EOMs intact, no neck masses noted. Respiratory: lungs clear to auscultation bilaterally. Cardiovascular: regular rate and rhythm, no murmurs, rubs or gallops.   Abdomen: normal bowel sounds, soft, non-tender to palpation. Extremities: no lower extremity edema, no cyanosis or clubbing. Neuro: alert and oriented x 3; non-focal exam.  Skin: no rashes. Psych: normal mood and affect. Allison Kerr MD  [facility]  [email or web address]  Phone: [phone]  Pager: [phone]  [email or web address]

## 2022-09-19 NOTE — DISCHARGE INSTRUCTIONS
Colonoscopy: What to Expect at [address] [place] [address]  After a colonoscopy, you'll stay at the clinic until you wake up. Then you can go home. But you'll need to arrange for a ride. Your doctor will tell you when you can eat and do your other usual activities. Your doctor will talk to you about when you'll need your next colonoscopy. Your doctor can help you decide how often you need to be checked. This will depend on the results of your test and your risk for colorectal cancer. After the test, you may be bloated or have gas pains. You may need to pass gas. If a biopsy was done or a polyp was removed, you may have streaks of blood in your stool (feces) for a few days. Problems such as heavy rectal bleeding may not occur until several weeks after the test. This isn't common. But it can happen after polyps are removed. This care sheet gives you a general idea about how long it will take for you to recover. But each person recovers at a different pace. Follow the steps below to get better as quickly as possible. How can you care for yourself at home? Activity    Rest when you feel tired. You can do your normal activities when it feels okay to do so. Diet    Follow your doctor's directions for eating. Unless your doctor has told you not to, drink plenty of fluids. This helps to replace the fluids that were lost during the colon prep. Do not drink alcohol. Medicines    Your doctor will tell you if and when you can restart your medicines. You will also be given instructions about taking any new medicines. If you take aspirin or some other blood thinner, ask your doctor if and when to start taking it again. Make sure that you understand exactly what your doctor wants you to do. If polyps were removed or a biopsy was done during the test, your doctor may tell you not to take aspirin or other anti-inflammatory medicines for a few days. These include ibuprofen (Advil, Motrin) and naproxen (Aleve). Other instructions    For your safety, do not drive or operate machinery until the medicine wears off and you can think clearly. Your doctor may tell you not to drive or operate machinery until the day after your test.     Do not sign legal documents or make major decisions until the medicine wears off and you can think clearly. The anesthesia can make it hard for you to fully understand what you are agreeing to. Follow-up care is a key part of your treatment and safety. Be sure to make and go to all appointments, and call your doctor if you are having problems. It's also a good idea to know your test results and keep a list of the medicines you take. When should you call for help? Call 911 anytime you think you may need emergency care. For example, call if:    You passed out (lost consciousness). You pass maroon or bloody stools. You have trouble breathing. Call your doctor now or seek immediate medical care if:    You have pain that does not get better after you take pain medicine. You are sick to your stomach or cannot drink fluids. You have new or worse belly pain. You have blood in your stools. You have a fever. You cannot pass stools or gas. Watch closely for changes in your health, and be sure to contact your doctor if you have any problems. Where can you learn more? Go to http://www.gray.com/  Enter E264 in the search box to learn more about \"Colonoscopy: What to Expect at Home. \"  Current as of: September 8, 2021               Content Version: 13.2  © 2006-2022 Healthwise, Incorporated. Care instructions adapted under license by [employer] (which disclaims liability or warranty for this information). If you have questions about a medical condition or this instruction, always ask your healthcare professional. [place] [address] any warranty or liability for your use of this information.      Diverticulosis: Care Instructions  Your Care Instructions  In diverticulosis, pouches called diverticula form in the wall of the large intestine (colon). The pouches do not cause any pain or other symptoms. Most people who have diverticulosis do not know they have it. But the pouches sometimes bleed, and if they become infected, they can cause pain and other symptoms. When this happens, it is called diverticulitis. Diverticula form when pressure pushes the wall of the colon outward at certain weak points. A diet that is too low in fiber can cause diverticula. Follow-up care is a key part of your treatment and safety. Be sure to make and go to all appointments, and call your doctor if you are having problems. It's also a good idea to know your test results and keep a list of the medicines you take. How can you care for yourself at home? Include fruits, leafy green vegetables, beans, and whole grains in your diet each day. These foods are high in fiber. Take a fiber supplement, such as Citrucel or Metamucil, every day if needed. Read and follow all instructions on the label. Drink plenty of fluids. If you have kidney, heart, or liver disease and have to limit fluids, talk with your doctor before you increase the amount of fluids you drink. Get at least 30 minutes of exercise on most days of the week. Walking is a good choice. You also may want to do other activities, such as running, swimming, cycling, or playing tennis or team sports. Cut out foods that cause gas, pain, or other symptoms. When should you call for help? Call your doctor now or seek immediate medical care if:    You have belly pain. You pass maroon or very bloody stools. You have a fever. You have nausea and vomiting. You have unusual changes in your bowel movements or abdominal swelling. You have burning pain when you urinate. You have abnormal vaginal discharge. You have shoulder pain.      You have cramping pain that does not get better when you have a bowel movement or pass gas. You pass gas or stool from your urethra while urinating. Watch closely for changes in your health, and be sure to contact your doctor if you have any problems. Where can you learn more? Go to http://www.gray.com/  Enter [id number] in the search box to learn more about \"Diverticulosis: Care Instructions. \"  Current as of: September 8, 2021               Content Version: 13.2  © 2006-2022 [employer]. Care instructions adapted under license by [employer] (which disclaims liability or warranty for this information). If you have questions about a medical condition or this instruction, always ask your healthcare professional. [address] any warranty or liability for your use of this information. Colon Polyps: Care Instructions  Your Care Instructions     Colon polyps are growths in the colon or the rectum. The cause of most colon polyps is not known, and most people who get them do not have any problems. But a certain kind can turn into cancer. For this reason, regular testing for colon polyps is important for people as they get older. It is also important for anyone who has an increased risk for colon cancer. Polyps are usually found through routine colon cancer screening tests. Although most colon polyps are not cancerous, they are usually removed and then tested for cancer. Screening for colon cancer saves lives because the cancer can usually be cured if it is caught early. If you have a polyp that is the type that can turn into cancer, you may need more tests to examine your entire colon. The doctor will remove any other polyps that he or she finds, and you will be tested more often. Follow-up care is a key part of your treatment and safety. Be sure to make and go to all appointments, and call your doctor if you are having problems.  It's also a good idea to know your test results and keep a list of the medicines you take. How can you care for yourself at home? Regular exams to look for colon polyps are the best way to prevent polyps from turning into colon cancer. These can include stool tests, sigmoidoscopy, colonoscopy, and CT colonography. Talk with your doctor about a testing schedule that is right for you. To prevent polyps  There is no home treatment that can prevent colon polyps. But these steps may help lower your risk for cancer. Stay active. Being active can help you get to and stay at a healthy weight. Try to exercise on most days of the week. Walking is a good choice. Eat well. Choose a variety of vegetables, fruits, legumes (such as peas and beans), fish, poultry, and whole grains. Do not smoke. If you need help quitting, talk to your doctor about stop-smoking programs and medicines. These can increase your chances of quitting for good. If you drink alcohol, limit how much you drink. Limit alcohol to 2 drinks a day for men and 1 drink a day for women. When should you call for help? Call your doctor now or seek immediate medical care if:    You have severe belly pain. Your stools are maroon or very bloody. Watch closely for changes in your health, and be sure to contact your doctor if:    You have a fever. You have nausea or vomiting. You have a change in bowel habits (new constipation or diarrhea). Your symptoms get worse or are not improving as expected. Where can you learn more? Go to http://www.baird.com/  Enter C571 in the search box to learn more about \"Colon Polyps: Care Instructions. \"  Current as of: September 8, 2021               Content Version: 13.2  © 9510-2671 Healthwise, Incorporated. Care instructions adapted under license by [employer] (which disclaims liability or warranty for this information).  If you have questions about a medical condition or this instruction, always ask your healthcare professional. [address] any warranty or liability for your use of this information. DISCHARGE SUMMARY from Nurse     POST-PROCEDURE INSTRUCTIONS:    Call your Physician if you:  Observe any excess bleeding. Develop a temperature over 100.5o F. Experience abdominal, shoulder or chest pain. Notice any signs of decreased circulation or nerve impairment to an extremity such as a change in color, persistent numbness, tingling, coldness or increase in pain. Vomit blood or you have nausea and vomiting lasting longer than 4 hours. Are unable to take medications. Are unable to urinate within 8 hours after discharge following general anesthesia or intravenous sedation. For the next 24 hours after receiving general anesthesia or intravenous sedation, or while taking prescription Narcotics, limit your activities:  Do NOT drive a motor vehicle, operate hazard machinery or power tools, or perform tasks that require coordination. The medication you received during your procedure may have some effect on your mental awareness. Do NOT make important personal or business decisions. The medication you received during your procedure may have some effect on your mental awareness. Do NOT drink alcoholic beverages. These drinks do not mix well with the medications that have been given to you. Upon discharge from the hospital, you must be accompanied by a responsible adult. Resume your diet as directed by your physician. Resume medications as your physician has prescribed. Please give a list of your current medications to your Primary Care Provider. Please update this list whenever your medications are discontinued, doses are changed, or new medications (including over-the-counter products) are added. Please carry medication information at all times in case of emergency situations.           These are general instructions for a healthy lifestyle:    No smoking/ No tobacco products/ Avoid exposure to second hand smoke. Surgeon General's Warning:  Quitting smoking now greatly reduces serious risk to your health. Obesity, smoking, and a sedentary lifestyle greatly increase your risk for illness. A healthy diet, regular physical exercise & weight monitoring are important for maintaining a healthy lifestyle  You may be retaining fluid if you have a history of heart failure or if you experience any of the following symptoms:  Weight gain of 3 pounds or more overnight or 5 pounds in a week, increased swelling in our hands or feet or shortness of breath while lying flat in bed. Please call your doctor as soon as you notice any of these symptoms; do not wait until your next office visit. Recognize signs and symptoms of STROKE:  F  -  Face looks uneven  A  -  Arms unable to move or move unevenly  S  -  Speech slurred or non-existent  T  -  Time to call 911 - as soon as signs and symptoms begin - DO NOT go back to bed or wait to see If you get better - TIME IS BRAIN. Colorectal Screening  Colorectal cancer almost always develops from precancerous polyps (abnormal growths) in the colon or rectum. Screening tests can find precancerous polyps, so that they can be removed before they turn into cancer. Screening tests can also find colorectal cancer early, when treatment works best.  Speak with your physician about when you should begin screening and how often you should be tested. [employer] Activation    Thank you for requesting access to [employer]. Please follow the instructions below to securely access and download your online medical record. [employer] allows you to send messages to your doctor, view your test results, renew your prescriptions, schedule appointments, and more. How Do I Sign Up? In your internet browser, go to [email or web address]. Click on the First Time User? Click Here link in the Sign In box. You will see the New Member Sign Up page.   Enter your appweevrt Access Code exactly as it appears below. You will not need to use this code after youve completed the sign-up process. If you do not sign up before the expiration date, you must request a new code. [employer] Access Code: Activation code not generated  Current [employer] Status: Active (This is the date your appweevrt access code will )    Enter the last four digits of your Social Security Number (xxxx) and Date of Birth (mm/dd/yyyy) as indicated and click Submit. You will be taken to the next sign-up page. Create a app[employer]evrt ID. This will be your [employer] login ID and cannot be changed, so think of one that is secure and easy to remember. Create a [employer] password. You can change your password at any time. Enter your Password Reset Question and Answer. This can be used at a later time if you forget your password. Enter your e-mail address. You will receive e-mail notification when new information is available in [address] Click Sign Up. You can now view and download portions of your medical record. Click the [employer] link to download a portable copy of your medical information. Additional Information    If you have questions, please call [phone]. Remember, [employer] is NOT to be used for urgent needs. For medical emergencies, dial 911. Educational references and/or instructions provided during this visit included:    See Attached      APPOINTMENTS:    Per MD Instruction    Discharge information has been reviewed with the patient. The patient verbalized understanding.

## 2023-03-02 ENCOUNTER — HOSPITAL ENCOUNTER (OUTPATIENT)
Age: 53
Discharge: HOME | End: 2023-03-02
Payer: MEDICAID

## 2023-03-02 DIAGNOSIS — N45.1 EPIDIDYMITIS: ICD-10-CM

## 2023-03-02 DIAGNOSIS — R36.1 HEMATOSPERMIA: ICD-10-CM

## 2023-03-02 DIAGNOSIS — N30.01 ACUTE CYSTITIS WITH HEMATURIA: ICD-10-CM

## 2023-03-02 DIAGNOSIS — R31.0 GROSS HEMATURIA: ICD-10-CM

## 2023-03-02 PROCEDURE — 76770 US EXAM ABDO BACK WALL COMP: CPT

## 2023-03-07 NOTE — RESULT ENCOUNTER NOTE
Raquel Weber,    Please call, his renal ultrasound was normal.  He should follow-up as scheduled for his cystoscopy    Thank you

## (undated) DEVICE — SUTURE PERMA HND SZ 0 L18IN NONABSORBABLE BLK L30MM PSL REV 580H

## (undated) DEVICE — UNIDIRECTIONAL STEERABLE DIAGNOSTIC CATHETER: Brand: EP XT™

## (undated) DEVICE — SYR 50ML SLIP TIP NSAF LF STRL --

## (undated) DEVICE — CATH US DIAG ACUNAV 8FRX90CM --

## (undated) DEVICE — BITE BLOCK ENDOSCP UNIV AD 6 TO 9.4 MM

## (undated) DEVICE — COPILOT BLEEDBACK CONTROL VALVE: Brand: COPILOT

## (undated) DEVICE — STERILE POLYISOPRENE POWDER-FREE SURGICAL GLOVES: Brand: PROTEXIS

## (undated) DEVICE — GAUZE,SPONGE,4"X4",16PLY,STRL,LF,10/TRAY: Brand: MEDLINE

## (undated) DEVICE — CATHETER SUCT TR FL TIP 14FR W/ O CTRL

## (undated) DEVICE — MEDI-VAC NON-CONDUCTIVE SUCTION TUBING: Brand: CARDINAL HEALTH

## (undated) DEVICE — PRESSURE MONITORING SET: Brand: TRUWAVE

## (undated) DEVICE — KIT ELECTRICAL UMBILICAL --

## (undated) DEVICE — AIRLIFE™ NASAL OXYGEN CANNULA CURVED, FLARED TIP WITH 14 FOOT (4.3 M) CRUSH-RESISTANT TUBING, OVER-THE-EAR STYLE: Brand: AIRLIFE™

## (undated) DEVICE — CABLE CATH CONN 10 PIN DISP -- ACHIEVE ADVANCE

## (undated) DEVICE — LIMB HOLDER, WRIST/ANKLE: Brand: DEROYAL

## (undated) DEVICE — MEDI-TRACE CADENCE ADULT, DEFIBRILLATION ELECTRODE -RTS  (10 PR/PK) - PHYSIO-CONTROL: Brand: MEDI-TRACE CADENCE

## (undated) DEVICE — REM POLYHESIVE ADULT PATIENT RETURN ELECTRODE: Brand: VALLEYLAB

## (undated) DEVICE — FCPS RAD JAW 4LC 240CM W/NDL -- BX/20 RADIAL JAW 4

## (undated) DEVICE — Device: Brand: NRG TRANSSEPTAL NEEDLE

## (undated) DEVICE — CATHETER ELECTROPHYSIOLOGY CRD 2 5 MM 6 FRX120 CM SUPREME

## (undated) DEVICE — YANKAUER,SMOOTH HANDLE,HIGH CAPACITY: Brand: MEDLINE INDUSTRIES, INC.

## (undated) DEVICE — ELECTRODE ES AD CRD L15FT DISP FOR PT BELOW 30LB REM

## (undated) DEVICE — Device

## (undated) DEVICE — INTRODUCER SHTH 7FR CANN L11CM DIL TIP 35MM ORNG TUNGSTEN

## (undated) DEVICE — KIT COAX UMBILICAL FOR N20 --

## (undated) DEVICE — SYR 20ML LL STRL LF --

## (undated) DEVICE — SNARE ENDOSCP POLYP 2.4 MM 240 CM 10 MM 2.8 MM CAPTIVATOR

## (undated) DEVICE — FLEX ADVANTAGE 3000CC: Brand: FLEX ADVANTAGE

## (undated) DEVICE — INTRO SHTH FST-CTH 0.038INX14 --

## (undated) DEVICE — CATH EP ACHV MPPNG 3.3FR 20MM -- ACHIEVE

## (undated) DEVICE — PACK PROCEDURE SURG VASC CATH 161 MMC LF

## (undated) DEVICE — SHTH STEERABLE FLEXCATH 12 FR --

## (undated) DEVICE — SYRINGE MED 25GA 3ML L5/8IN SUBQ PLAS W/ DETACH NDL SFTY

## (undated) DEVICE — INTRODUCER SHTH 9FR CANN L11CM DIL TIP 35MM BLK TUNGSTEN

## (undated) DEVICE — CANNULA NSL AD TBNG L14FT STD PVC O2 CRV CONN NONFLARED NSL

## (undated) DEVICE — TORFLEX TRANSSEPTAL SHEATH; TRANSSEPTAL DILATOR; J-TIP  GUIDEWIRE: Brand: TORFLEX TRANSSEPTAL GUIDING SHEATH

## (undated) DEVICE — CANNULA ORIG TL CLR W FOAM CUSHIONS AND 14FT SUPL TB 3 CHN

## (undated) DEVICE — SOLUTION IRRIG 1000ML H2O STRL BLT

## (undated) DEVICE — FLUFF AND POLYMER UNDERPAD,EXTRA HEAVY: Brand: WINGS

## (undated) DEVICE — LINER SUCT CANSTR 3000CC PLAS SFT PRE ASSEMB W/OUT TBNG W/

## (undated) DEVICE — CATHETER ART 20 GAX4 CM 22 GA RADIAL FEP

## (undated) DEVICE — PATCH CARTO 3 EXT REF --

## (undated) DEVICE — ENDOSCOPY PUMP TUBING/ CAP SET: Brand: ERBE

## (undated) DEVICE — CATH CRYOABLATN EP 28MM -- ARCTIC FRONT

## (undated) DEVICE — INTRODUCER SHTH 6FR CANN L11CM DIL TIP 35MM GRN TUNGSTEN

## (undated) DEVICE — MEDI-VAC SUCTION HIGH CAPACITY: Brand: CARDINAL HEALTH

## (undated) DEVICE — KENDALL RADIOLUCENT FOAM MONITORING ELECTRODE RECTANGULAR SHAPE: Brand: KENDALL

## (undated) DEVICE — CATHETER ABLAT 8FR L115CM 1-6-2MM SPC TIP 3.5MM FJ CRV

## (undated) DEVICE — INTRODUCER SHTH 8FR CANN L11CM DIL TIP 35MM BLU TUNGSTEN

## (undated) DEVICE — FORCEPS BX L240CM JAW DIA2.8MM L CAP W/ NDL MIC MESH TOOTH

## (undated) DEVICE — BASIN EMESIS 500CC ROSE 250/CS 60/PLT: Brand: MEDEGEN MEDICAL PRODUCTS, LLC

## (undated) DEVICE — TUBE SET IRR PUMP THERMALCOOL -- SMARTABLATE

## (undated) DEVICE — TRAP SPEC COLL POLYP POLYSTYR --

## (undated) DEVICE — GOWN ISOL IMPERV UNIV, DISP, OPEN BACK, BLUE --

## (undated) DEVICE — SYR 10ML LUER LOK 1/5ML GRAD --